# Patient Record
Sex: MALE | Race: OTHER | NOT HISPANIC OR LATINO
[De-identification: names, ages, dates, MRNs, and addresses within clinical notes are randomized per-mention and may not be internally consistent; named-entity substitution may affect disease eponyms.]

---

## 2017-03-15 PROBLEM — Z00.00 ENCOUNTER FOR PREVENTIVE HEALTH EXAMINATION: Status: ACTIVE | Noted: 2017-03-15

## 2017-05-10 ENCOUNTER — APPOINTMENT (OUTPATIENT)
Dept: CARDIOLOGY | Facility: CLINIC | Age: 82
End: 2017-05-10

## 2017-06-13 ENCOUNTER — OUTPATIENT (OUTPATIENT)
Dept: OUTPATIENT SERVICES | Facility: HOSPITAL | Age: 82
LOS: 1 days | Discharge: HOME | End: 2017-06-13

## 2017-06-13 DIAGNOSIS — N18.6 END STAGE RENAL DISEASE: ICD-10-CM

## 2017-06-13 DIAGNOSIS — E11.9 TYPE 2 DIABETES MELLITUS WITHOUT COMPLICATIONS: ICD-10-CM

## 2017-06-13 DIAGNOSIS — J96.00 ACUTE RESPIRATORY FAILURE, UNSPECIFIED WHETHER WITH HYPOXIA OR HYPERCAPNIA: ICD-10-CM

## 2017-06-13 DIAGNOSIS — I25.10 ATHEROSCLEROTIC HEART DISEASE OF NATIVE CORONARY ARTERY WITHOUT ANGINA PECTORIS: ICD-10-CM

## 2017-06-13 DIAGNOSIS — D64.9 ANEMIA, UNSPECIFIED: ICD-10-CM

## 2017-06-22 ENCOUNTER — APPOINTMENT (OUTPATIENT)
Dept: CARDIOLOGY | Facility: CLINIC | Age: 82
End: 2017-06-22

## 2017-06-28 DIAGNOSIS — R79.89 OTHER SPECIFIED ABNORMAL FINDINGS OF BLOOD CHEMISTRY: ICD-10-CM

## 2017-07-08 ENCOUNTER — OUTPATIENT (OUTPATIENT)
Dept: OUTPATIENT SERVICES | Facility: HOSPITAL | Age: 82
LOS: 1 days | Discharge: HOME | End: 2017-07-08

## 2017-07-08 DIAGNOSIS — D64.9 ANEMIA, UNSPECIFIED: ICD-10-CM

## 2017-07-08 DIAGNOSIS — I25.10 ATHEROSCLEROTIC HEART DISEASE OF NATIVE CORONARY ARTERY WITHOUT ANGINA PECTORIS: ICD-10-CM

## 2017-07-08 DIAGNOSIS — J96.00 ACUTE RESPIRATORY FAILURE, UNSPECIFIED WHETHER WITH HYPOXIA OR HYPERCAPNIA: ICD-10-CM

## 2017-07-08 DIAGNOSIS — E11.9 TYPE 2 DIABETES MELLITUS WITHOUT COMPLICATIONS: ICD-10-CM

## 2017-07-08 DIAGNOSIS — N18.6 END STAGE RENAL DISEASE: ICD-10-CM

## 2017-07-10 DIAGNOSIS — E10.9 TYPE 1 DIABETES MELLITUS WITHOUT COMPLICATIONS: ICD-10-CM

## 2017-07-10 DIAGNOSIS — E11.9 TYPE 2 DIABETES MELLITUS WITHOUT COMPLICATIONS: ICD-10-CM

## 2018-01-02 ENCOUNTER — OUTPATIENT (OUTPATIENT)
Dept: OUTPATIENT SERVICES | Facility: HOSPITAL | Age: 83
LOS: 1 days | Discharge: HOME | End: 2018-01-02

## 2018-01-02 DIAGNOSIS — D64.9 ANEMIA, UNSPECIFIED: ICD-10-CM

## 2018-01-02 DIAGNOSIS — I25.10 ATHEROSCLEROTIC HEART DISEASE OF NATIVE CORONARY ARTERY WITHOUT ANGINA PECTORIS: ICD-10-CM

## 2018-01-02 DIAGNOSIS — N18.9 CHRONIC KIDNEY DISEASE, UNSPECIFIED: ICD-10-CM

## 2018-01-02 DIAGNOSIS — N18.6 END STAGE RENAL DISEASE: ICD-10-CM

## 2018-01-02 DIAGNOSIS — J96.00 ACUTE RESPIRATORY FAILURE, UNSPECIFIED WHETHER WITH HYPOXIA OR HYPERCAPNIA: ICD-10-CM

## 2018-01-02 DIAGNOSIS — E11.9 TYPE 2 DIABETES MELLITUS WITHOUT COMPLICATIONS: ICD-10-CM

## 2018-02-11 ENCOUNTER — INPATIENT (INPATIENT)
Facility: HOSPITAL | Age: 83
LOS: 4 days | Discharge: NURSING, REHAB OR LONG-TERM CARE | End: 2018-02-16
Attending: INTERNAL MEDICINE | Admitting: INTERNAL MEDICINE

## 2018-02-11 VITALS
OXYGEN SATURATION: 100 % | RESPIRATION RATE: 18 BRPM | SYSTOLIC BLOOD PRESSURE: 121 MMHG | DIASTOLIC BLOOD PRESSURE: 56 MMHG | HEART RATE: 81 BPM

## 2018-02-11 DIAGNOSIS — Z95.1 PRESENCE OF AORTOCORONARY BYPASS GRAFT: Chronic | ICD-10-CM

## 2018-02-11 DIAGNOSIS — I25.810 ATHEROSCLEROSIS OF CORONARY ARTERY BYPASS GRAFT(S) WITHOUT ANGINA PECTORIS: ICD-10-CM

## 2018-02-11 DIAGNOSIS — N18.6 END STAGE RENAL DISEASE: ICD-10-CM

## 2018-02-11 DIAGNOSIS — I48.2 CHRONIC ATRIAL FIBRILLATION: ICD-10-CM

## 2018-02-11 DIAGNOSIS — I77.0 ARTERIOVENOUS FISTULA, ACQUIRED: Chronic | ICD-10-CM

## 2018-02-11 DIAGNOSIS — J18.9 PNEUMONIA, UNSPECIFIED ORGANISM: ICD-10-CM

## 2018-02-11 DIAGNOSIS — E11.22 TYPE 2 DIABETES MELLITUS WITH DIABETIC CHRONIC KIDNEY DISEASE: ICD-10-CM

## 2018-02-11 LAB
ANION GAP SERPL CALC-SCNC: 17 MMOL/L — HIGH (ref 7–14)
APTT BLD: 31.9 SEC — SIGNIFICANT CHANGE UP (ref 27–39.2)
BASOPHILS # BLD AUTO: 0.03 K/UL — SIGNIFICANT CHANGE UP (ref 0–0.2)
BASOPHILS NFR BLD AUTO: 0.2 % — SIGNIFICANT CHANGE UP (ref 0–1)
BUN SERPL-MCNC: 69 MG/DL — CRITICAL HIGH (ref 10–20)
CALCIUM SERPL-MCNC: 9 MG/DL — SIGNIFICANT CHANGE UP (ref 8.5–10.1)
CHLORIDE SERPL-SCNC: 93 MMOL/L — LOW (ref 98–110)
CK MB CFR SERPL CALC: 2.4 NG/ML — SIGNIFICANT CHANGE UP (ref 0.6–6.3)
CO2 SERPL-SCNC: 25 MMOL/L — SIGNIFICANT CHANGE UP (ref 17–32)
CREAT SERPL-MCNC: 5.5 MG/DL — CRITICAL HIGH (ref 0.7–1.5)
EOSINOPHIL # BLD AUTO: 0.03 K/UL — SIGNIFICANT CHANGE UP (ref 0–0.7)
EOSINOPHIL NFR BLD AUTO: 0.2 % — SIGNIFICANT CHANGE UP (ref 0–8)
GLUCOSE SERPL-MCNC: 164 MG/DL — HIGH (ref 70–110)
HCT VFR BLD CALC: 30 % — LOW (ref 42–52)
HGB BLD-MCNC: 8.5 G/DL — LOW (ref 14–18)
IMM GRANULOCYTES NFR BLD AUTO: 0.9 % — HIGH (ref 0.1–0.3)
INR BLD: 1.02 RATIO — SIGNIFICANT CHANGE UP (ref 0.65–1.3)
LACTATE SERPL-SCNC: 1.3 MMOL/L — SIGNIFICANT CHANGE UP (ref 0.5–2.2)
LYMPHOCYTES # BLD AUTO: 0.43 K/UL — LOW (ref 1.2–3.4)
LYMPHOCYTES # BLD AUTO: 2.2 % — LOW (ref 20.5–51.1)
MCHC RBC-ENTMCNC: 25.9 PG — LOW (ref 27–31)
MCHC RBC-ENTMCNC: 28.3 G/DL — LOW (ref 32–37)
MCV RBC AUTO: 91.5 FL — SIGNIFICANT CHANGE UP (ref 80–94)
MONOCYTES # BLD AUTO: 1.09 K/UL — HIGH (ref 0.1–0.6)
MONOCYTES NFR BLD AUTO: 5.5 % — SIGNIFICANT CHANGE UP (ref 1.7–9.3)
NEUTROPHILS # BLD AUTO: 17.99 K/UL — HIGH (ref 1.4–6.5)
NEUTROPHILS NFR BLD AUTO: 91 % — HIGH (ref 42.2–75.2)
PLATELET # BLD AUTO: 195 K/UL — SIGNIFICANT CHANGE UP (ref 130–400)
POTASSIUM SERPL-MCNC: 5.1 MMOL/L — HIGH (ref 3.5–5)
POTASSIUM SERPL-SCNC: 5.1 MMOL/L — HIGH (ref 3.5–5)
PROTHROM AB SERPL-ACNC: 11 SEC — SIGNIFICANT CHANGE UP (ref 9.95–12.87)
RBC # BLD: 3.28 M/UL — LOW (ref 4.7–6.1)
RBC # FLD: 19.5 % — HIGH (ref 11.5–14.5)
SODIUM SERPL-SCNC: 135 MMOL/L — SIGNIFICANT CHANGE UP (ref 135–146)
TROPONIN I SERPL-MCNC: 0.05 NG/ML — SIGNIFICANT CHANGE UP (ref 0–0.05)
WBC # BLD: 19.75 K/UL — HIGH (ref 4.8–10.8)
WBC # FLD AUTO: 19.75 K/UL — HIGH (ref 4.8–10.8)

## 2018-02-11 RX ORDER — CEFEPIME 1 G/1
500 INJECTION, POWDER, FOR SOLUTION INTRAMUSCULAR; INTRAVENOUS ONCE
Qty: 0 | Refills: 0 | Status: COMPLETED | OUTPATIENT
Start: 2018-02-11 | End: 2018-02-11

## 2018-02-11 RX ORDER — ATORVASTATIN CALCIUM 80 MG/1
40 TABLET, FILM COATED ORAL AT BEDTIME
Qty: 0 | Refills: 0 | Status: DISCONTINUED | OUTPATIENT
Start: 2018-02-11 | End: 2018-02-16

## 2018-02-11 RX ORDER — SIMETHICONE 80 MG/1
0.6 TABLET, CHEWABLE ORAL
Qty: 0 | Refills: 0 | COMMUNITY

## 2018-02-11 RX ORDER — NIFEDIPINE 30 MG
1 TABLET, EXTENDED RELEASE 24 HR ORAL
Qty: 0 | Refills: 0 | COMMUNITY

## 2018-02-11 RX ORDER — SODIUM CHLORIDE 9 MG/ML
500 INJECTION, SOLUTION INTRAVENOUS ONCE
Qty: 0 | Refills: 0 | Status: COMPLETED | OUTPATIENT
Start: 2018-02-11 | End: 2018-02-11

## 2018-02-11 RX ORDER — POLYETHYLENE GLYCOL 3350 17 G/17G
17 POWDER, FOR SOLUTION ORAL
Qty: 0 | Refills: 0 | COMMUNITY

## 2018-02-11 RX ORDER — SENNA PLUS 8.6 MG/1
2 TABLET ORAL
Qty: 0 | Refills: 0 | COMMUNITY

## 2018-02-11 RX ORDER — VANCOMYCIN HCL 1 G
VIAL (EA) INTRAVENOUS
Qty: 0 | Refills: 0 | Status: DISCONTINUED | OUTPATIENT
Start: 2018-02-11 | End: 2018-02-12

## 2018-02-11 RX ORDER — SENNA PLUS 8.6 MG/1
2 TABLET ORAL AT BEDTIME
Qty: 0 | Refills: 0 | Status: DISCONTINUED | OUTPATIENT
Start: 2018-02-11 | End: 2018-02-16

## 2018-02-11 RX ORDER — CEFEPIME 1 G/1
INJECTION, POWDER, FOR SOLUTION INTRAMUSCULAR; INTRAVENOUS
Qty: 0 | Refills: 0 | Status: DISCONTINUED | OUTPATIENT
Start: 2018-02-11 | End: 2018-02-16

## 2018-02-11 RX ORDER — PANTOPRAZOLE SODIUM 20 MG/1
40 TABLET, DELAYED RELEASE ORAL
Qty: 0 | Refills: 0 | Status: DISCONTINUED | OUTPATIENT
Start: 2018-02-11 | End: 2018-02-16

## 2018-02-11 RX ORDER — DOCUSATE SODIUM 100 MG
100 CAPSULE ORAL
Qty: 0 | Refills: 0 | Status: DISCONTINUED | OUTPATIENT
Start: 2018-02-11 | End: 2018-02-16

## 2018-02-11 RX ORDER — ATORVASTATIN CALCIUM 80 MG/1
1 TABLET, FILM COATED ORAL
Qty: 0 | Refills: 0 | COMMUNITY

## 2018-02-11 RX ORDER — VANCOMYCIN HCL 1 G
500 VIAL (EA) INTRAVENOUS ONCE
Qty: 0 | Refills: 0 | Status: COMPLETED | OUTPATIENT
Start: 2018-02-11 | End: 2018-02-11

## 2018-02-11 RX ORDER — ACETAMINOPHEN 500 MG
650 TABLET ORAL EVERY 6 HOURS
Qty: 0 | Refills: 0 | Status: DISCONTINUED | OUTPATIENT
Start: 2018-02-11 | End: 2018-02-16

## 2018-02-11 RX ORDER — IPRATROPIUM/ALBUTEROL SULFATE 18-103MCG
3 AEROSOL WITH ADAPTER (GRAM) INHALATION EVERY 6 HOURS
Qty: 0 | Refills: 0 | Status: DISCONTINUED | OUTPATIENT
Start: 2018-02-11 | End: 2018-02-16

## 2018-02-11 RX ORDER — AMIODARONE HYDROCHLORIDE 400 MG/1
100 TABLET ORAL
Qty: 0 | Refills: 0 | Status: DISCONTINUED | OUTPATIENT
Start: 2018-02-11 | End: 2018-02-16

## 2018-02-11 RX ORDER — CEFEPIME 1 G/1
1000 INJECTION, POWDER, FOR SOLUTION INTRAMUSCULAR; INTRAVENOUS EVERY 12 HOURS
Qty: 0 | Refills: 0 | Status: DISCONTINUED | OUTPATIENT
Start: 2018-02-11 | End: 2018-02-11

## 2018-02-11 RX ORDER — LACTULOSE 10 G/15ML
15 SOLUTION ORAL
Qty: 0 | Refills: 0 | COMMUNITY

## 2018-02-11 RX ORDER — ASPIRIN/CALCIUM CARB/MAGNESIUM 324 MG
81 TABLET ORAL DAILY
Qty: 0 | Refills: 0 | Status: DISCONTINUED | OUTPATIENT
Start: 2018-02-11 | End: 2018-02-16

## 2018-02-11 RX ORDER — HEPARIN SODIUM 5000 [USP'U]/ML
5000 INJECTION INTRAVENOUS; SUBCUTANEOUS EVERY 8 HOURS
Qty: 0 | Refills: 0 | Status: DISCONTINUED | OUTPATIENT
Start: 2018-02-11 | End: 2018-02-16

## 2018-02-11 RX ORDER — CLOPIDOGREL BISULFATE 75 MG/1
1 TABLET, FILM COATED ORAL
Qty: 0 | Refills: 0 | COMMUNITY

## 2018-02-11 RX ORDER — CEFEPIME 1 G/1
500 INJECTION, POWDER, FOR SOLUTION INTRAMUSCULAR; INTRAVENOUS EVERY 24 HOURS
Qty: 0 | Refills: 0 | Status: DISCONTINUED | OUTPATIENT
Start: 2018-02-11 | End: 2018-02-11

## 2018-02-11 RX ORDER — OMEPRAZOLE 10 MG/1
1 CAPSULE, DELAYED RELEASE ORAL
Qty: 0 | Refills: 0 | COMMUNITY

## 2018-02-11 RX ORDER — SEVELAMER CARBONATE 2400 MG/1
1 POWDER, FOR SUSPENSION ORAL
Qty: 0 | Refills: 0 | COMMUNITY

## 2018-02-11 RX ORDER — CLOPIDOGREL BISULFATE 75 MG/1
75 TABLET, FILM COATED ORAL DAILY
Qty: 0 | Refills: 0 | Status: DISCONTINUED | OUTPATIENT
Start: 2018-02-11 | End: 2018-02-16

## 2018-02-11 RX ORDER — AZITHROMYCIN 500 MG/1
TABLET, FILM COATED ORAL
Qty: 0 | Refills: 0 | Status: DISCONTINUED | OUTPATIENT
Start: 2018-02-11 | End: 2018-02-11

## 2018-02-11 RX ORDER — ALBUTEROL 90 UG/1
0.5 AEROSOL, METERED ORAL
Qty: 0 | Refills: 0 | COMMUNITY

## 2018-02-11 RX ORDER — DOCUSATE SODIUM 100 MG
2 CAPSULE ORAL
Qty: 0 | Refills: 0 | COMMUNITY

## 2018-02-11 RX ORDER — CEFEPIME 1 G/1
500 INJECTION, POWDER, FOR SOLUTION INTRAMUSCULAR; INTRAVENOUS EVERY 24 HOURS
Qty: 0 | Refills: 0 | Status: DISCONTINUED | OUTPATIENT
Start: 2018-02-12 | End: 2018-02-16

## 2018-02-11 RX ORDER — AZITHROMYCIN 500 MG/1
500 TABLET, FILM COATED ORAL ONCE
Qty: 0 | Refills: 0 | Status: COMPLETED | OUTPATIENT
Start: 2018-02-11 | End: 2018-02-11

## 2018-02-11 RX ORDER — SEVELAMER CARBONATE 2400 MG/1
800 POWDER, FOR SUSPENSION ORAL
Qty: 0 | Refills: 0 | Status: DISCONTINUED | OUTPATIENT
Start: 2018-02-11 | End: 2018-02-16

## 2018-02-11 RX ADMIN — Medication 125 MILLIGRAM(S): at 22:46

## 2018-02-11 RX ADMIN — CEFEPIME 100 MILLIGRAM(S): 1 INJECTION, POWDER, FOR SOLUTION INTRAMUSCULAR; INTRAVENOUS at 23:01

## 2018-02-11 RX ADMIN — AZITHROMYCIN 255 MILLIGRAM(S): 500 TABLET, FILM COATED ORAL at 18:14

## 2018-02-11 RX ADMIN — Medication 100 MILLIGRAM(S): at 23:39

## 2018-02-11 RX ADMIN — HEPARIN SODIUM 5000 UNIT(S): 5000 INJECTION INTRAVENOUS; SUBCUTANEOUS at 22:45

## 2018-02-11 RX ADMIN — SODIUM CHLORIDE 1000 MILLILITER(S): 9 INJECTION, SOLUTION INTRAVENOUS at 16:26

## 2018-02-11 RX ADMIN — CEFEPIME 100 MILLIGRAM(S): 1 INJECTION, POWDER, FOR SOLUTION INTRAMUSCULAR; INTRAVENOUS at 16:35

## 2018-02-11 NOTE — H&P ADULT - PROBLEM SELECTOR PLAN 1
High risk for drug resistant organisms due to ESRD and NH resident  - Febrile, CXR shows opacities, high WBC  - Vancomycin, Cefepime  - FU Blood, Urine, Sputum cultures, MRSA swab  - FU Influenza swab  - Tylenol for fevers  - BIPAP, O2, Nebulizers, Solumedrol (takes prednisone 5mg at home, unknown why)  - DNR but not DNI, intubate if resp decompensation  - Hold BP meds as BP is borderline

## 2018-02-11 NOTE — ED PROVIDER NOTE - PHYSICAL EXAMINATION
elderly, frail, tachypneic, Skin  warm and dry, no acute rash. PERRLA/EOM, conjunctiva and sclera clear. MM moist, no nasal discharge.  Pharynx and TM's unremarkable. Neck supple nt, no meningeal signs. Heart tachycardic. Lungs- b/l rhonchi and crackles. Abdomen soft ntnd no r/g. Extremities- moves all, +equal distal pulses, sensation wnl, normal ROM. No LE edema, calves nttp b/l.

## 2018-02-11 NOTE — H&P ADULT - PROBLEM SELECTOR PLAN 5
- Follow up FS  - Diet controlled, start insulin if FS elevated  - PPx: Heparin, Protonix  - DNR, not DNI  - From NH, discharge when stable back to [facility]  - Contact Daughter Faviola Nunez at [phone]

## 2018-02-11 NOTE — ED ADULT NURSE NOTE - OBJECTIVE STATEMENT
Pt presents to ED BIBA from [facility] for c/o respiratory distress. Pt normally on 3-4L NC and at NH pt O2 sat was 78%. Pt placed on BIPAP in ED. O2 sat 100%. As per EMS, pt had cough and febrile at NH. Pt afebrile in ED at this time.

## 2018-02-11 NOTE — H&P ADULT - NSHPLABSRESULTS_GEN_ALL_CORE
8.5    19.75 )-----------( 195      ( 11 Feb 2018 15:53 )             30.0   CBC Full  -  ( 11 Feb 2018 15:53 )  WBC Count : 19.75 K/uL  Hemoglobin : 8.5 g/dL  Hematocrit : 30.0 %  Platelet Count - Automated : 195 K/uL  Mean Cell Volume : 91.5 fL  Mean Cell Hemoglobin : 25.9 pg  Mean Cell Hemoglobin Concentration : 28.3 g/dL  Auto Neutrophil # : 17.99 K/uL  Auto Lymphocyte # : 0.43 K/uL  Auto Monocyte # : 1.09 K/uL  Auto Eosinophil # : 0.03 K/uL  Auto Basophil # : 0.03 K/uL  Auto Neutrophil % : 91.0 %  Auto Lymphocyte % : 2.2 %  Auto Monocyte % : 5.5 %  Auto Eosinophil % : 0.2 %  Auto Basophil % : 0.2 %    02-11    135  |  93<L>  |  69<HH>  ----------------------------<  164<H>  5.1<H>   |  25  |  5.5<HH>    Ca    9.0      11 Feb 2018 15:53        CARDIAC MARKERS ( 11 Feb 2018 15:53 )  0.05 ng/mL / x     / x     / x     / 2.4 ng/mL      PT/INR - ( 11 Feb 2018 15:53 )   PT: 11.00 sec;   INR: 1.02 ratio         PTT - ( 11 Feb 2018 15:53 )  PTT:31.9 sec    IMAGING:  CXR shows bilateral opacities, pending read

## 2018-02-11 NOTE — H&P ADULT - PROBLEM SELECTOR PLAN 2
- FU labs  - HD as needed; M/W/F  - FU renal recs  - c/w Renvela  - Regular nephrologist is Dr. Santiago

## 2018-02-11 NOTE — ED PROVIDER NOTE - ATTENDING CONTRIBUTION TO CARE
87 y/o male with hx of ESRD on HD, c/o fever, cough, SOB x 2 days. No vomiting. Presented with mild respiratory distress, lungs with coarse breath sounds b/l. S1 S2 regular. ABD soft, no tenderness. CXR with worsening right pleural effusion, b/l opacities. WBC 20. Imp: Ddx includes flu, pneumonia. No sign of PE. A/P: BiPAP, Abx, tamiflu, Will admit. Stable for floor admission.

## 2018-02-11 NOTE — H&P ADULT - PMH
Atrial fibrillation    Coronary artery disease involving coronary bypass graft without angina pectoris, unspecified whether native or transplanted heart    Diabetes    ESRD (end stage renal disease)    Hemodialysis access, fistula mature    Pleural effusion due to another disorder

## 2018-02-11 NOTE — H&P ADULT - NSHPREVIEWOFSYSTEMS_GEN_ALL_CORE
REVIEW OF SYSTEMS:    CONSTITUTIONAL: See HPI  EYES/ENT: No visual changes;  No vertigo or throat pain   NECK: No pain or stiffness  RESPIRATORY: See HPI  CARDIOVASCULAR: No chest pain or palpitations  GASTROINTESTINAL: No abdominal or epigastric pain. No nausea, vomiting, or hematemesis; No diarrhea or constipation. No melena or hematochezia.  GENITOURINARY: No dysuria, frequency or hematuria  NEUROLOGICAL: No numbness or weakness  SKIN: No itching, rashes

## 2018-02-11 NOTE — ED PROVIDER NOTE - OBJECTIVE STATEMENT
85 yo M from [facility] with pmh noted p/w respiratory distress and fever x 1 day, pt poor historian but denies any complaints, not in pain.

## 2018-02-11 NOTE — H&P ADULT - ATTENDING COMMENTS
Patient seen and examined and . Agree with above note except that   Patient is  1. septic on admission with Pneumonia, HCAP. Nursing home patient. need ID evaluation     2. Acute respiratory failure needing BIPAP, Hypoxic with Plural Effusion - Pulmonary evaluation if thoracocentesis is necessary.     Other wise c/w current management.     D/W House staff Problem: Pneumonia.  Plan: High risk for drug resistant organisms due to ESRD and NH resident  - Febrile, CXR shows opacities, high WBC  - Vancomycin, Cefepime  - FU Blood, Urine, Sputum cultures, MRSA swab  - FU Influenza swab  - Tylenol for fevers  - BIPAP, O2, Nebulizers, Solumedrol (takes prednisone 5mg at home, unknown why)  - DNR but not DNI, intubate if resp decompensation  - Hold BP meds as BP is borderline.      Problem/Plan - 2:  ·  Problem: ESRD (end stage renal disease).  Plan: - FU labs  - HD as needed; M/W/F  - FU renal recs  - c/w Renvela  - Regular nephrologist is Dr. Santiago.      Problem/Plan - 3:  ·  Problem: Coronary artery disease involving coronary bypass graft without angina pectoris, unspecified whether native or transplanted heart.  Plan: c/w ASA, Plavix, Lipitor.      Problem/Plan - 4:  ·  Problem: Chronic atrial fibrillation.  Plan: - Not on AC due to fall risk  - c/w Amiodarone, hold nifedipine, enalapril.      Problem/Plan - 5:  ·  Problem: Type 2 diabetes mellitus with chronic kidney disease on chronic dialysis, without long-term current use of insulin.  Plan: - Follow up FS  - Diet controlled, start insulin if FS elevated  - PPx: Heparin, Protonix  - DNR, not DNI  - From NH, discharge when stable back to [facility]  - Contact Daughter Faviola Nunez at [phone].           Patient seen and examined and . Agree with above note except that   Patient is  1. septic on admission with Pneumonia, HCAP. Nursing home patient. need ID evaluation     2. Acute respiratory failure needing BIPAP, Hypoxic with Plural Effusion - Pulmonary evaluation if thoracocentesis is necessary.     Other wise c/w current management.     D/W House staff 1. Cough fever lethargy hypoxia Sepsis hypotension 2/2 Pneumonia       Opacities on CXR, elevaeted WBC      c/w Vancomycin, Cefepime started at NH      FU Blood, Urine, Sputum cultures, MRSA/ Influenza swabs     Tylenol for fevers     Agree w/ BIPAP, O2, Nebulizers, Solumedrol       Pt is DNR but not DNI, intubate if resp decompensation     Hold BP meds      2.  ESRD       FU labs      HD  M/W/F      Renal eval      c/w Renvela     3.  CAD       c/w ASA, Plavix, Lipitor      4.   Chronic atrial fibrillation/ HTN       Not on AC due to fall risk       c/w Amiodarone, hold nifedipine, enalapril.     5.  DM2       Follow up FS       Diet controlled, start insulin if FS elevated    6. PPx: Heparin, Protonix        - Contact Daughter Faviola Nunez at [phone].           Patient seen and examined and . Agree with above note except that   Patient is  1. septic on admission with Pneumonia, HCAP. Nursing home patient. need ID evaluation     2. Acute respiratory failure needing BIPAP, Hypoxic with Plural Effusion - Pulmonary evaluation if thoracocentesis is necessary.     Other wise c/w current management.     D/W House staff

## 2018-02-11 NOTE — H&P ADULT - NSHPPHYSICALEXAM_GEN_ALL_CORE
PHYSICAL EXAM:  GENERAL: Elderly M lying in bed. In moderate respiratory distress  HEAD:  Atraumatic, Normocephalic  EYES: EOMI, PERRLA, conjunctiva and sclera clear  NECK: Supple, No JVD  CHEST/LUNG: Bilateral rales + rhonchi. No wheeze  HEART: Irregular rate and rhythm; No murmurs, rubs, or gallops  ABDOMEN: Soft, Nontender, Nondistended; Hypoactive bowel sounds  EXTREMITIES:  2+ Peripheral Pulses, No clubbing, cyanosis, +2 LE pitting edema b/l  PSYCH: AAOx3  NEUROLOGY: non-focal  SKIN: RLE bandage in place due to weeping blister

## 2018-02-11 NOTE — H&P ADULT - ASSESSMENT
86 M from NH with ESRD, AFib, CAD, p/w Fevers, leukocytosis, cough, lethargy, CXR shows bilateral pneumonia.

## 2018-02-11 NOTE — ED ADULT NURSE NOTE - CHIEF COMPLAINT QUOTE
Pt BIBA from [facility] for c/o respiratory distress, fever and cough x2days. Pt alert, in no distress. Pt placed on Bipap.

## 2018-02-12 RX ORDER — VANCOMYCIN HCL 1 G
500 VIAL (EA) INTRAVENOUS
Qty: 0 | Refills: 0 | Status: COMPLETED | OUTPATIENT
Start: 2018-02-12 | End: 2018-02-12

## 2018-02-12 RX ORDER — VANCOMYCIN HCL 1 G
500 VIAL (EA) INTRAVENOUS
Qty: 0 | Refills: 0 | Status: DISCONTINUED | OUTPATIENT
Start: 2018-02-12 | End: 2018-02-13

## 2018-02-12 RX ADMIN — Medication 100 MILLIGRAM(S): at 18:50

## 2018-02-12 RX ADMIN — SEVELAMER CARBONATE 800 MILLIGRAM(S): 2400 POWDER, FOR SUSPENSION ORAL at 18:52

## 2018-02-12 RX ADMIN — Medication 3 MILLILITER(S): at 20:52

## 2018-02-12 RX ADMIN — HEPARIN SODIUM 5000 UNIT(S): 5000 INJECTION INTRAVENOUS; SUBCUTANEOUS at 18:52

## 2018-02-12 RX ADMIN — Medication 81 MILLIGRAM(S): at 12:04

## 2018-02-12 RX ADMIN — CLOPIDOGREL BISULFATE 75 MILLIGRAM(S): 75 TABLET, FILM COATED ORAL at 12:04

## 2018-02-12 RX ADMIN — SENNA PLUS 2 TABLET(S): 8.6 TABLET ORAL at 22:49

## 2018-02-12 RX ADMIN — HEPARIN SODIUM 5000 UNIT(S): 5000 INJECTION INTRAVENOUS; SUBCUTANEOUS at 05:52

## 2018-02-12 RX ADMIN — Medication 3 MILLILITER(S): at 01:23

## 2018-02-12 RX ADMIN — Medication 125 MILLIGRAM(S): at 05:52

## 2018-02-12 RX ADMIN — HEPARIN SODIUM 5000 UNIT(S): 5000 INJECTION INTRAVENOUS; SUBCUTANEOUS at 22:47

## 2018-02-12 RX ADMIN — ATORVASTATIN CALCIUM 40 MILLIGRAM(S): 80 TABLET, FILM COATED ORAL at 22:49

## 2018-02-12 RX ADMIN — AMIODARONE HYDROCHLORIDE 100 MILLIGRAM(S): 400 TABLET ORAL at 18:51

## 2018-02-12 RX ADMIN — Medication 125 MILLIGRAM(S): at 18:50

## 2018-02-12 RX ADMIN — Medication 100 MILLIGRAM(S): at 16:19

## 2018-02-12 RX ADMIN — SEVELAMER CARBONATE 800 MILLIGRAM(S): 2400 POWDER, FOR SUSPENSION ORAL at 18:50

## 2018-02-12 NOTE — PATIENT PROFILE ADULT. - PMH
Atrial fibrillation    Chronic obstructive pulmonary disease, unspecified COPD type    Coronary artery disease involving coronary bypass graft without angina pectoris, unspecified whether native or transplanted heart    Diabetes    ESRD (end stage renal disease)    Hemodialysis access, fistula mature    Hypertension, unspecified type    Pleural effusion due to another disorder

## 2018-02-12 NOTE — PROGRESS NOTE ADULT - SUBJECTIVE AND OBJECTIVE BOX
KEE GARCIA, male, 86y ([birth year]),   MRN-[id number]  Admit Date: 02-11-18 (1d)    Chief Complaint  Patient is a 86y old  Male who presents with a chief complaint of Cough, Fevers, Lethargy x 2 days (11 Feb 2018 22:07)      Past Medical and Surgical History  PAST MEDICAL & SURGICAL HISTORY:  Atrial fibrillation  Pleural effusion due to another disorder  Diabetes  Coronary artery disease involving coronary bypass graft without angina pectoris, unspecified whether native or transplanted heart  Hemodialysis access, fistula mature  ESRD (end stage renal disease)  A-V fistula  S/P CABG x 3      Current Medications:  MEDICATIONS  (STANDING):  ALBUTerol/ipratropium for Nebulization 3 milliLiter(s) Nebulizer every 6 hours  amiodarone    Tablet 100 milliGRAM(s) Oral two times a day  aspirin enteric coated 81 milliGRAM(s) Oral daily  atorvastatin 40 milliGRAM(s) Oral at bedtime  cefepime  IVPB      cefepime  IVPB 500 milliGRAM(s) IV Intermittent every 24 hours  clopidogrel Tablet 75 milliGRAM(s) Oral daily  docusate sodium 100 milliGRAM(s) Oral two times a day  heparin  Injectable 5000 Unit(s) SubCutaneous every 8 hours  methylPREDNISolone sodium succinate Injectable 125 milliGRAM(s) IV Push every 8 hours  pantoprazole   Suspension 40 milliGRAM(s) Oral before breakfast  senna 2 Tablet(s) Oral at bedtime  sevelamer hydrochloride 800 milliGRAM(s) Oral three times a day with meals  vancomycin  IVPB        MEDICATIONS  (PRN):  acetaminophen  Suppository 650 milliGRAM(s) Rectal every 6 hours PRN For Temp greater than 38 C (100.4 F)      Interval History:  No acute events overnight. No complaints at this time.    Vital Signs:  T(F): 96.4 (02-12-18 @ 13:35), Max: 101.6 (02-11-18 @ 16:20)  HR: 71 (02-12-18 @ 13:35) (71 - 84)  BP: 120/60 (02-12-18 @ 13:35) (90/57 - 151/65)  RR: 20 (02-12-18 @ 13:35) (18 - 20)  SpO2: 97% (02-12-18 @ 13:35) (97% - 100%)  CAPILLARY BLOOD GLUCOSE          Physical Exam:  General: Not in distress.   HEENT: Moist mucus membranes. PERRLA.  Cardio: irrigular rate and rhythm, S1, S2, no murmur, rub, or gallop.  Pulm: Clear to auscultation bilaterally. No wheezing, rales, or rhonchi  Abdomen: Soft, non-tender, non-distended. Normoactive bowel sounds  Extremities: No cyanosis or edema bilaterally. No calf tenderness to palpation  Neuro: Cantonese speaking A&O x3. No focal deficits. CN II - XII grossly intact.    Labs and Imaging:  CBC Full  -  ( 11 Feb 2018 15:53 )  WBC Count : 19.75 K/uL  Hemoglobin : 8.5 g/dL  Hematocrit : 30.0 %  Platelet Count - Automated : 195 K/uL  Mean Cell Volume : 91.5 fL  Mean Cell Hemoglobin : 25.9 pg  Mean Cell Hemoglobin Concentration : 28.3 g/dL  Auto Neutrophil # : 17.99 K/uL  Auto Lymphocyte # : 0.43 K/uL  Auto Monocyte # : 1.09 K/uL  Auto Eosinophil # : 0.03 K/uL  Auto Basophil # : 0.03 K/uL  Auto Neutrophil % : 91.0 %  Auto Lymphocyte % : 2.2 %  Auto Monocyte % : 5.5 %  Auto Eosinophil % : 0.2 %  Auto Basophil % : 0.2 %    RDW: 19.5    PT/INR/PTT: 02-11-18 @ 15:53  11.00 | 31.9        ^      1.02    BMP: 02-11-18 @ 15:53  135 | 93 | 69   -----------------< 164  5.1  | 25 | 5.5  eGFR(AA): 10, eGFR (non-AA): 9   Ca 9.0, Mg --, P --        LFTs: 02-11-18 @ 15:53  Ca  9.0  | -- AST   -----------------  TP  --  | -- ALT  -----------------  Alb --  | -- ALK          ^        --         TB      Cardiac Enzymes:  Troponin I, Serum: 0.05 ng/mL (02-11-18 @ 15:53)    Urinalysis:    Cultures:      Home Medications:  Home Medications:  albuterol 5 mg/mL (0.5%) inhalation solution: 0.5 milliliter(s) inhaled every 6 hours (11 Feb 2018 21:52)  amiodarone 100 mg oral tablet: 2 tab(s) orally once a day (11 Feb 2018 21:52)  Aspir 81 oral delayed release tablet: 1 tab(s) orally once a day (11 Feb 2018 21:52)  Colace 100 mg oral capsule: 2 cap(s) orally once a day (11 Feb 2018 21:52)  enalapril 5 mg oral tablet: 1 tab(s) orally once a day (11 Feb 2018 21:52)  lactulose 10 g/15 mL oral syrup: 15 milliliter(s) orally once a day (11 Feb 2018 21:52)  Lipitor 40 mg oral tablet: 1 tab(s) orally once a day (11 Feb 2018 21:52)  MiraLax oral powder for reconstitution: 17 gram(s) orally once a day (11 Feb 2018 21:52)  NIFEdipine 60 mg oral tablet, extended release: 1 tab(s) orally once a day (11 Feb 2018 21:52)  Plavix 75 mg oral tablet: 1 tab(s) orally once a day (11 Feb 2018 21:52)  predniSONE 5 mg oral tablet: 1 tab(s) orally once a day (11 Feb 2018 21:52)  PriLOSEC 20 mg oral delayed release capsule: 1 cap(s) orally once a day (11 Feb 2018 21:52)  Renvela 800 mg oral tablet: 1 tab(s) orally 3 times a day (with meals) (11 Feb 2018 21:52)  Senokot 8.6 mg oral tablet: 2 tab(s) orally once a day (at bedtime) (11 Feb 2018 21:52)  simethicone 40 mg/0.6 mL oral liquid: 0.6 milliliter(s) orally 4 times a day (after meals and at bedtime) (11 Feb 2018 21:52)      Assessment:  86y male with PMH listed presented for .  Found to have Pneumonia  Type 2 diabetes mellitus with chronic kidney disease on chronic dialysis, without long-term current use of insulin  Chronic atrial fibrillation  Coronary artery disease involving coronary bypass graft without angina pectoris, unspecified whether native or transplanted heart  ESRD (end stage renal disease)  Pneumonia      Plan:  Pneumonia  Type 2 diabetes mellitus with chronic kidney disease on chronic dialysis, without long-term current use of insulin  Chronic atrial fibrillation  Coronary artery disease involving coronary bypass graft without angina pectoris, unspecified whether native or transplanted heart  ESRD (end stage renal disease)  Pneumonia      Tiffani Bucio MD.  Date/Time: 02-12-18 @ 13:46

## 2018-02-12 NOTE — CONSULT NOTE ADULT - ASSESSMENT
# ESRD on HD for HD today 3h opti 160 , 2K UF 2 l as tolerated  # rule out PNA , rule out flu on cefepime and Vanco already continue, check blood cultures, follow up official CXR   # Anemia acute on chronic will check SUSANNA dose at NH, check Fe studies transfuse if Hb<7

## 2018-02-12 NOTE — CONSULT NOTE ADULT - SUBJECTIVE AND OBJECTIVE BOX
NEPHROLOGY CONSULTATION NOTE    Patient is a 86y Male whom presented to the hospital with     PAST MEDICAL & SURGICAL HISTORY:  Atrial fibrillation  Pleural effusion due to another disorder  Diabetes  Coronary artery disease involving coronary bypass graft without angina pectoris, unspecified whether native or transplanted heart  Hemodialysis access, fistula mature  ESRD (end stage renal disease)  A-V fistula  S/P CABG x 3    Allergies:  No Known Allergies    Home Medications Reviewed  Hospital Medications:   MEDICATIONS  (STANDING):  ALBUTerol/ipratropium for Nebulization 3 milliLiter(s) Nebulizer every 6 hours  amiodarone    Tablet 100 milliGRAM(s) Oral two times a day  aspirin enteric coated 81 milliGRAM(s) Oral daily  atorvastatin 40 milliGRAM(s) Oral at bedtime  cefepime  IVPB      cefepime  IVPB 500 milliGRAM(s) IV Intermittent every 24 hours  clopidogrel Tablet 75 milliGRAM(s) Oral daily  docusate sodium 100 milliGRAM(s) Oral two times a day  heparin  Injectable 5000 Unit(s) SubCutaneous every 8 hours  methylPREDNISolone sodium succinate Injectable 125 milliGRAM(s) IV Push every 8 hours  pantoprazole   Suspension 40 milliGRAM(s) Oral before breakfast  senna 2 Tablet(s) Oral at bedtime  sevelamer hydrochloride 800 milliGRAM(s) Oral three times a day with meals  vancomycin  IVPB          SOCIAL HISTORY:  Denies ETOH,Smoking,   FAMILY HISTORY:  No pertinent family history in first degree relatives        REVIEW OF SYSTEMS:  CONSTITUTIONAL:  weakness, no fevers or chills  EYES/ENT: No visual changes;  No vertigo or throat pain   NECK: No pain or stiffness  RESPIRATORY: shortness of breath with cough  CARDIOVASCULAR: No chest pain or palpitations.  GASTROINTESTINAL: No abdominal or epigastric pain. No nausea, vomiting, or hematemesis; No diarrhea or constipation. No melena or hematochezia.  GENITOURINARY: No dysuria, frequency, foamy urine, urinary urgency, incontinence or hematuria  VASCULAR: No bilateral lower extremity edema.   All other review of systems is negative unless indicated above.    VITALS:  T(F): 96.4 (02-12-18 @ 07:48), Max: 101.6 (02-11-18 @ 16:20)  HR: 73 (02-12-18 @ 07:48)  BP: 151/65 (02-12-18 @ 07:48)  RR: 20 (02-12-18 @ 07:48)  SpO2: 99% (02-12-18 @ 07:48)    02-11 @ 07:01  -  02-12 @ 07:00  --------------------------------------------------------  IN: 1350 mL / OUT: 0 mL / NET: 1350 mL            I&O's Detail    11 Feb 2018 07:01  -  12 Feb 2018 07:00  --------------------------------------------------------  IN:    IV PiggyBack: 350 mL    Lactated Ringers IV Bolus: 1000 mL  Total IN: 1350 mL    OUT:  Total OUT: 0 mL    Total NET: 1350 mL            PHYSICAL EXAM:  Constitutional: NAD  HEENT: anicteric sclera, oropharynx clear, MMM  Neck: No JVD  Respiratory: CTAB, no wheezes, rales or rhonchi  Cardiovascular: S1, S2, RRR  Gastrointestinal: BS+, soft, NT/ND  Extremities: No cyanosis or clubbing. No peripheral edema  Neurological: A/O x 3, no focal deficits  Psychiatric: Normal mood, normal affect  : No CVA tenderness. No mcclain.   Skin: No rashes  Vascular Access:    LABS:  02-11    135  |  93<L>  |  69<HH>  ----------------------------<  164<H>  5.1<H>   |  25  |  5.5<HH>    Ca    9.0      11 Feb 2018 15:53      Creatinine Trend: 5.5 <--                        8.5    19.75 )-----------( 195      ( 11 Feb 2018 15:53 )             30.0     Urine Studies:      RADIOLOGY & ADDITIONAL STUDIES:   results pending NEPHROLOGY CONSULTATION NOTE    Patient is a 86y Male whom presented to the hospital with shortness of breath fever and chills going back to several days ptp . Denied any chest pain no chest pain no GI symptoms no other complaints.    PAST MEDICAL & SURGICAL HISTORY:  Atrial fibrillation  Pleural effusion due to another disorder  Diabetes  Coronary artery disease involving coronary bypass graft without angina pectoris, unspecified whether native or transplanted heart  Hemodialysis access, fistula mature  ESRD (end stage renal disease)  A-V fistula  S/P CABG x 3    Allergies:  No Known Allergies    Home Medications Reviewed  Hospital Medications:   MEDICATIONS  (STANDING):  ALBUTerol/ipratropium for Nebulization 3 milliLiter(s) Nebulizer every 6 hours  amiodarone    Tablet 100 milliGRAM(s) Oral two times a day  aspirin enteric coated 81 milliGRAM(s) Oral daily  atorvastatin 40 milliGRAM(s) Oral at bedtime  cefepime  IVPB      cefepime  IVPB 500 milliGRAM(s) IV Intermittent every 24 hours  clopidogrel Tablet 75 milliGRAM(s) Oral daily  docusate sodium 100 milliGRAM(s) Oral two times a day  heparin  Injectable 5000 Unit(s) SubCutaneous every 8 hours  methylPREDNISolone sodium succinate Injectable 125 milliGRAM(s) IV Push every 8 hours  pantoprazole   Suspension 40 milliGRAM(s) Oral before breakfast  senna 2 Tablet(s) Oral at bedtime  sevelamer hydrochloride 800 milliGRAM(s) Oral three times a day with meals  vancomycin  IVPB          SOCIAL HISTORY:  Denies ETOH,Smoking,   FAMILY HISTORY:  No pertinent family history in first degree relatives        REVIEW OF SYSTEMS:  CONSTITUTIONAL:  positive weakness, fevers at NH   EYES/ENT: No visual changes;  No vertigo or throat pain   NECK: No pain or stiffness  RESPIRATORY: shortness of breath with cough  CARDIOVASCULAR: No chest pain or palpitations.  GASTROINTESTINAL: No abdominal or epigastric pain. No nausea, vomiting, or hematemesis; No diarrhea or constipation. No melena or hematochezia.  GENITOURINARY: No dysuria, frequency, foamy urine, urinary urgency, incontinence or hematuria  VASCULAR: No bilateral lower extremity edema.   All other review of systems is negative unless indicated above.    VITALS:  T(F): 96.4 (02-12-18 @ 07:48), Max: 101.6 (02-11-18 @ 16:20)  HR: 73 (02-12-18 @ 07:48)  BP: 151/65 (02-12-18 @ 07:48)  RR: 20 (02-12-18 @ 07:48)  SpO2: 99% (02-12-18 @ 07:48)    02-11 @ 07:01  -  02-12 @ 07:00  --------------------------------------------------------  IN: 1350 mL / OUT: 0 mL / NET: 1350 mL            I&O's Detail    11 Feb 2018 07:01  -  12 Feb 2018 07:00  --------------------------------------------------------  IN:    IV PiggyBack: 350 mL    Lactated Ringers IV Bolus: 1000 mL  Total IN: 1350 mL    OUT:  Total OUT: 0 mL    Total NET: 1350 mL            PHYSICAL EXAM:  Constitutional: weak lethargic   HEENT: anicteric sclera, oropharynx clear, MMM  Neck: No JVD  Respiratory: wheezes and rhonchi at bases   Cardiovascular: S1, S2, RRR  Gastrointestinal: BS+, soft, NT/ND  Extremities: No cyanosis or clubbing. No peripheral edema  Neurological: A/O x 3, no focal deficits  Vascular Access: left arm AVF     LABS:  02-11    135  |  93<L>  |  69<HH>  ----------------------------<  164<H>  5.1<H>   |  25  |  5.5<HH>    Ca    9.0      11 Feb 2018 15:53      Creatinine Trend: 5.5 <--                        8.5    19.75 )-----------( 195      ( 11 Feb 2018 15:53 )             30.0     Urine Studies:      RADIOLOGY & ADDITIONAL STUDIES:   results pending

## 2018-02-12 NOTE — PROGRESS NOTE ADULT - PROBLEM SELECTOR PLAN 1
High risk for drug resistant organisms due to ESRD and NH resident  - Febrile on admission. No fevers in last 24hrs  , CXR shows opacities, high WBC  - c/w Vancomycin, Cefepime  - FU Blood, Urine, Sputum cultures, MRSA swab  - FU Influenza swab  - Tylenol for fevers  - BIPAP, O2, Nebulizers, Solumedrol (takes prednisone 5mg at home, unknown why)  - DNR but not DNI, intubate if resp decompensation  - Hold BP meds as BP is borderline

## 2018-02-12 NOTE — PROGRESS NOTE ADULT - ATTENDING COMMENTS
1. Cough fever lethargy hypoxia Sepsis hypotension 2/2 Pneumonia       Opacities on CXR, elevaeted WBC      c/w Vancomycin, Cefepime started at NH      FU Blood, Urine, Sputum cultures, MRSA/ Influenza swabs     Tylenol for fevers     Agree w/ BIPAP, O2, Nebulizers, Solumedrol       Pt is DNR but not DNI, intubate if resp decompensation     Hold BP meds      2.  ESRD       FU labs      HD  M/W/F      Renal eval      c/w Renvela     3.  CAD       c/w ASA, Plavix, Lipitor      4.   Chronic atrial fibrillation/ HTN       Not on AC due to fall risk       c/w Amiodarone, hold nifedipine, enalapril.     5.  DM2       Follow up FS       Diet controlled, start insulin if FS elevated    6. PPx: Heparin, Protonix 1. Cough fever lethargy hypoxia Sepsis hypotension 2/2 Pneumonia       Opacities on CXR, elevaeted WBC      c/w Vancomycin, Cefepime started at NH      FU Blood, Urine, Sputum cultures, MRSA/ Influenza swabs     Tylenol for fevers     Agree w/ BIPAP, O2, Nebulizers, Solumedrol       Pt is DNR but not DNI, intubate if resp decompensation     Hold BP meds      2.  ESRD       FU labs      HD  M/W/F      Renal eval appreciated      c/w Renvela     3.  CAD       c/w ASA, Plavix, Lipitor      4.   Chronic atrial fibrillation/ HTN       Not on AC due to fall risk       c/w Amiodarone, hold nifedipine, enalapril.     5.  DM2       Follow up FS       Diet controlled, start insulin if FS elevated    6. PPx: Heparin, Protonix

## 2018-02-12 NOTE — PROGRESS NOTE ADULT - SUBJECTIVE AND OBJECTIVE BOX
KEE GARCIA, male, 86y ([birth year]),   MRN-[id number]  Admit Date: 02-11-18 (1d)    Chief Complaint  Patient is a 86y old  Male who presents with a chief complaint of Cough, Fevers, Lethargy x 2 days (11 Feb 2018 22:07)      Past Medical and Surgical History  PAST MEDICAL & SURGICAL HISTORY:  Atrial fibrillation  Pleural effusion due to another disorder  Diabetes  Coronary artery disease involving coronary bypass graft without angina pectoris, unspecified whether native or transplanted heart  Hemodialysis access, fistula mature  ESRD (end stage renal disease)  A-V fistula  S/P CABG x 3      Current Medications:  MEDICATIONS  (STANDING):  ALBUTerol/ipratropium for Nebulization 3 milliLiter(s) Nebulizer every 6 hours  amiodarone    Tablet 100 milliGRAM(s) Oral two times a day  aspirin enteric coated 81 milliGRAM(s) Oral daily  atorvastatin 40 milliGRAM(s) Oral at bedtime  cefepime  IVPB      cefepime  IVPB 500 milliGRAM(s) IV Intermittent every 24 hours  clopidogrel Tablet 75 milliGRAM(s) Oral daily  docusate sodium 100 milliGRAM(s) Oral two times a day  heparin  Injectable 5000 Unit(s) SubCutaneous every 8 hours  methylPREDNISolone sodium succinate Injectable 125 milliGRAM(s) IV Push every 8 hours  pantoprazole   Suspension 40 milliGRAM(s) Oral before breakfast  senna 2 Tablet(s) Oral at bedtime  sevelamer hydrochloride 800 milliGRAM(s) Oral three times a day with meals  vancomycin  IVPB        MEDICATIONS  (PRN):  acetaminophen  Suppository 650 milliGRAM(s) Rectal every 6 hours PRN For Temp greater than 38 C (100.4 F)      Interval History:  No acute events overnight. No complaints at this time.    Vital Signs:  T(F): 96.4 (02-12-18 @ 07:48), Max: 101.6 (02-11-18 @ 16:20)  HR: 73 (02-12-18 @ 07:48) (73 - 84)  BP: 151/65 (02-12-18 @ 07:48) (90/57 - 151/65)  RR: 20 (02-12-18 @ 07:48) (18 - 20)  SpO2: 98% (02-12-18 @ 09:45) (98% - 100%)            Physical Exam:  General: Not in distress.   HEENT: Moist mucus membranes. PERRLA.  Cardio: Regular rate and rhythm, S1, S2, no murmur, rub, or gallop.  Pulm: Clear to auscultation bilaterally. No wheezing, rales, or rhonchi  Abdomen: Soft, non-tender, non-distended. Normoactive bowel sounds  Extremities: No cyanosis or edema bilaterally. No calf tenderness to palpation  Neuro: A&O x3. No focal deficits. CN II - XII grossly intact.    Labs and Imaging:  CBC Full  -  ( 11 Feb 2018 15:53 )  WBC Count : 19.75 K/uL  Hemoglobin : 8.5 g/dL  Hematocrit : 30.0 %  Platelet Count - Automated : 195 K/uL  Mean Cell Volume : 91.5 fL  Mean Cell Hemoglobin : 25.9 pg  Mean Cell Hemoglobin Concentration : 28.3 g/dL  Auto Neutrophil # : 17.99 K/uL  Auto Lymphocyte # : 0.43 K/uL  Auto Monocyte # : 1.09 K/uL  Auto Eosinophil # : 0.03 K/uL  Auto Basophil # : 0.03 K/uL  Auto Neutrophil % : 91.0 %  Auto Lymphocyte % : 2.2 %  Auto Monocyte % : 5.5 %  Auto Eosinophil % : 0.2 %  Auto Basophil % : 0.2 %    RDW: 19.5    PT/INR/PTT: 02-11-18 @ 15:53  11.00 | 31.9        ^      1.02    BMP: 02-11-18 @ 15:53  135 | 93 | 69   -----------------< 164  5.1  | 25 | 5.5  eGFR(AA): 10, eGFR (non-AA): 9   Ca 9.0, Mg --, P --        LFTs: 02-11-18 @ 15:53  Ca  9.0  | -- AST   -----------------  TP  --  | -- ALT  -----------------  Alb --  | -- ALK          ^        --         TB      Cardiac Enzymes:  Troponin I, Serum: 0.05 ng/mL (02-11-18 @ 15:53)      Home Medications:  Home Medications:  albuterol 5 mg/mL (0.5%) inhalation solution: 0.5 milliliter(s) inhaled every 6 hours (11 Feb 2018 21:52)  amiodarone 100 mg oral tablet: 2 tab(s) orally once a day (11 Feb 2018 21:52)  Aspir 81 oral delayed release tablet: 1 tab(s) orally once a day (11 Feb 2018 21:52)  Colace 100 mg oral capsule: 2 cap(s) orally once a day (11 Feb 2018 21:52)  enalapril 5 mg oral tablet: 1 tab(s) orally once a day (11 Feb 2018 21:52)  lactulose 10 g/15 mL oral syrup: 15 milliliter(s) orally once a day (11 Feb 2018 21:52)  Lipitor 40 mg oral tablet: 1 tab(s) orally once a day (11 Feb 2018 21:52)  MiraLax oral powder for reconstitution: 17 gram(s) orally once a day (11 Feb 2018 21:52)  NIFEdipine 60 mg oral tablet, extended release: 1 tab(s) orally once a day (11 Feb 2018 21:52)  Plavix 75 mg oral tablet: 1 tab(s) orally once a day (11 Feb 2018 21:52)  predniSONE 5 mg oral tablet: 1 tab(s) orally once a day (11 Feb 2018 21:52)  PriLOSEC 20 mg oral delayed release capsule: 1 cap(s) orally once a day (11 Feb 2018 21:52)  Renvela 800 mg oral tablet: 1 tab(s) orally 3 times a day (with meals) (11 Feb 2018 21:52)  Senokot 8.6 mg oral tablet: 2 tab(s) orally once a day (at bedtime) (11 Feb 2018 21:52)  simethicone 40 mg/0.6 mL oral liquid: 0.6 milliliter(s) orally 4 times a day (after meals and at bedtime) (11 Feb 2018 21:52)      Assessment:  86y male with PMH listed presented for .  Found to have Pneumonia  Type 2 diabetes mellitus with chronic kidney disease on chronic dialysis, without long-term current use of insulin  Chronic atrial fibrillation  Coronary artery disease involving coronary bypass graft without angina pectoris, unspecified whether native or transplanted heart  ESRD (end stage renal disease)  Pneumonia      Plan:  Pneumonia  Type 2 diabetes mellitus with chronic kidney disease on chronic dialysis, without long-term current use of insulin  Chronic atrial fibrillation  Coronary artery disease involving coronary bypass graft without angina pectoris, unspecified whether native or transplanted heart  ESRD (end stage renal disease)  Pneumonia      Tiffani Bucio MD.  Date/Time: 02-12-18 @ 13:28 KEE GARCIA, male, 86y ([birth year]),   [id number]  Admit Date: 02-11-18 (1d)    Chief Complaint  Patient is a 86y old  Male who presents with a chief complaint of Cough, Fevers, Lethargy x 2 days (11 Feb 2018 22:07)      Past Medical and Surgical History  PAST MEDICAL & SURGICAL HISTORY:  Atrial fibrillation  Pleural effusion due to another disorder  Diabetes  Coronary artery disease involving coronary bypass graft without angina pectoris, unspecified whether native or transplanted heart  Hemodialysis access, fistula mature  ESRD (end stage renal disease)  A-V fistula  S/P CABG x 3      Current Medications:  MEDICATIONS  (STANDING):  ALBUTerol/ipratropium for Nebulization 3 milliLiter(s) Nebulizer every 6 hours  amiodarone    Tablet 100 milliGRAM(s) Oral two times a day  aspirin enteric coated 81 milliGRAM(s) Oral daily  atorvastatin 40 milliGRAM(s) Oral at bedtime  cefepime  IVPB      cefepime  IVPB 500 milliGRAM(s) IV Intermittent every 24 hours  clopidogrel Tablet 75 milliGRAM(s) Oral daily  docusate sodium 100 milliGRAM(s) Oral two times a day  heparin  Injectable 5000 Unit(s) SubCutaneous every 8 hours  methylPREDNISolone sodium succinate Injectable 125 milliGRAM(s) IV Push every 8 hours  pantoprazole   Suspension 40 milliGRAM(s) Oral before breakfast  senna 2 Tablet(s) Oral at bedtime  sevelamer hydrochloride 800 milliGRAM(s) Oral three times a day with meals  vancomycin  IVPB        MEDICATIONS  (PRN):  acetaminophen  Suppository 650 milliGRAM(s) Rectal every 6 hours PRN For Temp greater than 38 C (100.4 F)      Interval History:  No acute events overnight. No complaints at this time.    Vital Signs:  T(F): 96.4 (02-12-18 @ 07:48), Max: 101.6 (02-11-18 @ 16:20)  HR: 73 (02-12-18 @ 07:48) (73 - 84)  BP: 151/65 (02-12-18 @ 07:48) (90/57 - 151/65)  RR: 20 (02-12-18 @ 07:48) (18 - 20)  SpO2: 98% (02-12-18 @ 09:45) (98% - 100%)            Physical Exam:  General: Not in distress.   HEENT: Moist mucus membranes. PERRLA.  Cardio: Regular rate and rhythm, S1, S2, no murmur, rub, or gallop.  Pulm: Clear to auscultation bilaterally. No wheezing, rales, or rhonchi  Abdomen: Soft, non-tender, non-distended. Normoactive bowel sounds  Extremities: No cyanosis or edema bilaterally. No calf tenderness to palpation  Neuro: A&O x3. No focal deficits. CN II - XII grossly intact.    Labs and Imaging:  CBC Full  -  ( 11 Feb 2018 15:53 )  WBC Count : 19.75 K/uL  Hemoglobin : 8.5 g/dL  Hematocrit : 30.0 %  Platelet Count - Automated : 195 K/uL  Mean Cell Volume : 91.5 fL  Mean Cell Hemoglobin : 25.9 pg  Mean Cell Hemoglobin Concentration : 28.3 g/dL  Auto Neutrophil # : 17.99 K/uL  Auto Lymphocyte # : 0.43 K/uL  Auto Monocyte # : 1.09 K/uL  Auto Eosinophil # : 0.03 K/uL  Auto Basophil # : 0.03 K/uL  Auto Neutrophil % : 91.0 %  Auto Lymphocyte % : 2.2 %  Auto Monocyte % : 5.5 %  Auto Eosinophil % : 0.2 %  Auto Basophil % : 0.2 %    RDW: 19.5    PT/INR/PTT: 02-11-18 @ 15:53  11.00 | 31.9        ^      1.02    BMP: 02-11-18 @ 15:53  135 | 93 | 69   -----------------< 164  5.1  | 25 | 5.5  eGFR(AA): 10, eGFR (non-AA): 9   Ca 9.0, Mg --, P --        LFTs: 02-11-18 @ 15:53  Ca  9.0  | -- AST   -----------------  TP  --  | -- ALT  -----------------  Alb --  | -- ALK          ^        --         TB      Cardiac Enzymes:  Troponin I, Serum: 0.05 ng/mL (02-11-18 @ 15:53)    < from: Xray Chest 1 View AP/PA (02.11.18 @ 17:48) >  Large right-sided pleural effusion which is increased in size from the   prior study        Home Medications:  Home Medications:  albuterol 5 mg/mL (0.5%) inhalation solution: 0.5 milliliter(s) inhaled every 6 hours (11 Feb 2018 21:52)  amiodarone 100 mg oral tablet: 2 tab(s) orally once a day (11 Feb 2018 21:52)  Aspir 81 oral delayed release tablet: 1 tab(s) orally once a day (11 Feb 2018 21:52)  Colace 100 mg oral capsule: 2 cap(s) orally once a day (11 Feb 2018 21:52)  enalapril 5 mg oral tablet: 1 tab(s) orally once a day (11 Feb 2018 21:52)  lactulose 10 g/15 mL oral syrup: 15 milliliter(s) orally once a day (11 Feb 2018 21:52)  Lipitor 40 mg oral tablet: 1 tab(s) orally once a day (11 Feb 2018 21:52)  MiraLax oral powder for reconstitution: 17 gram(s) orally once a day (11 Feb 2018 21:52)  NIFEdipine 60 mg oral tablet, extended release: 1 tab(s) orally once a day (11 Feb 2018 21:52)  Plavix 75 mg oral tablet: 1 tab(s) orally once a day (11 Feb 2018 21:52)  predniSONE 5 mg oral tablet: 1 tab(s) orally once a day (11 Feb 2018 21:52)  PriLOSEC 20 mg oral delayed release capsule: 1 cap(s) orally once a day (11 Feb 2018 21:52)  Renvela 800 mg oral tablet: 1 tab(s) orally 3 times a day (with meals) (11 Feb 2018 21:52)  Senokot 8.6 mg oral tablet: 2 tab(s) orally once a day (at bedtime) (11 Feb 2018 21:52)  simethicone 40 mg/0.6 mL oral liquid: 0.6 milliliter(s) orally 4 times a day (after meals and at bedtime) (11 Feb 2018 21:52)      Assessment:  86y male with PMH listed presented for .  Found to have Pneumonia  Type 2 diabetes mellitus with chronic kidney disease on chronic dialysis, without long-term current use of insulin  Chronic atrial fibrillation  Coronary artery disease involving coronary bypass graft without angina pectoris, unspecified whether native or transplanted heart  ESRD (end stage renal disease)  Pneumonia      Plan:  Pneumonia  Type 2 diabetes mellitus with chronic kidney disease on chronic dialysis, without long-term current use of insulin  Chronic atrial fibrillation  Coronary artery disease involving coronary bypass graft without angina pectoris, unspecified whether native or transplanted heart  ESRD (end stage renal disease)  Pneumonia      Tiffani Bucio MD.  Date/Time: 02-12-18 @ 13:28

## 2018-02-12 NOTE — PROGRESS NOTE ADULT - PROBLEM SELECTOR PLAN 1
High risk for drug resistant organisms due to ESRD and NH resident  - Febrile, CXR shows opacities, high WBC  - Vancomycin, Cefepime  - FU Blood, Urine, Sputum cultures, MRSA swab  - FU Influenza swab  - Tylenol for fevers  - BIPAP, O2, Nebulizers, Solumedrol (takes prednisone 5mg at home, unknown why)  - DNR but not DNI, intubate if resp decompensation  - Hold BP meds as BP is borderline High risk for drug resistant organisms due to ESRD and NH resident  - Febrile, CXR shows opacities, high WBC  - Vancomycin, Cefepime  -Large pleural effusion on R side. Pulmonary consult.   - FU Blood, Urine, Sputum cultures, MRSA swab  - FU Influenza swab  - Tylenol for fevers  - BIPAP, O2, Nebulizers, Solumedrol (takes prednisone 5mg at home, unknown why)  - DNR but not DNI, intubate if resp decompensation  - Hold BP meds as BP is borderline

## 2018-02-13 DIAGNOSIS — M79.89 OTHER SPECIFIED SOFT TISSUE DISORDERS: ICD-10-CM

## 2018-02-13 LAB
ALBUMIN SERPL ELPH-MCNC: 2.1 G/DL — LOW (ref 3–5.5)
ALP SERPL-CCNC: 126 U/L — HIGH (ref 30–115)
ALT FLD-CCNC: 15 U/L — SIGNIFICANT CHANGE UP (ref 0–41)
ANION GAP SERPL CALC-SCNC: 16 MMOL/L — HIGH (ref 7–14)
AST SERPL-CCNC: 19 U/L — SIGNIFICANT CHANGE UP (ref 0–41)
BILIRUB SERPL-MCNC: 0.7 MG/DL — SIGNIFICANT CHANGE UP (ref 0.2–1.2)
BUN SERPL-MCNC: 52 MG/DL — HIGH (ref 10–20)
CALCIUM SERPL-MCNC: 8.4 MG/DL — LOW (ref 8.5–10.1)
CHLORIDE SERPL-SCNC: 94 MMOL/L — LOW (ref 98–110)
CO2 SERPL-SCNC: 27 MMOL/L — SIGNIFICANT CHANGE UP (ref 17–32)
CREAT SERPL-MCNC: 4.2 MG/DL — CRITICAL HIGH (ref 0.7–1.5)
GLUCOSE SERPL-MCNC: 170 MG/DL — HIGH (ref 70–110)
HCT VFR BLD CALC: 26.4 % — LOW (ref 42–52)
HGB BLD-MCNC: 7.8 G/DL — LOW (ref 14–18)
MAGNESIUM SERPL-MCNC: 2.7 MG/DL — HIGH (ref 1.8–2.4)
MCHC RBC-ENTMCNC: 26.4 PG — LOW (ref 27–31)
MCHC RBC-ENTMCNC: 29.5 G/DL — LOW (ref 32–37)
MCV RBC AUTO: 89.2 FL — SIGNIFICANT CHANGE UP (ref 80–94)
NRBC # BLD: 0 /100 WBCS — SIGNIFICANT CHANGE UP (ref 0–0)
PLATELET # BLD AUTO: 153 K/UL — SIGNIFICANT CHANGE UP (ref 130–400)
POTASSIUM SERPL-MCNC: 4.9 MMOL/L — SIGNIFICANT CHANGE UP (ref 3.5–5)
POTASSIUM SERPL-SCNC: 4.9 MMOL/L — SIGNIFICANT CHANGE UP (ref 3.5–5)
PROT SERPL-MCNC: 5.5 G/DL — LOW (ref 6–8)
RBC # BLD: 2.96 M/UL — LOW (ref 4.7–6.1)
RBC # FLD: 20 % — HIGH (ref 11.5–14.5)
SODIUM SERPL-SCNC: 137 MMOL/L — SIGNIFICANT CHANGE UP (ref 135–146)
WBC # BLD: 18.84 K/UL — HIGH (ref 4.8–10.8)
WBC # FLD AUTO: 18.84 K/UL — HIGH (ref 4.8–10.8)

## 2018-02-13 RX ORDER — BACITRACIN ZINC 500 UNIT/G
1 OINTMENT IN PACKET (EA) TOPICAL
Qty: 0 | Refills: 0 | Status: DISCONTINUED | OUTPATIENT
Start: 2018-02-13 | End: 2018-02-16

## 2018-02-13 RX ORDER — VANCOMYCIN HCL 1 G
500 VIAL (EA) INTRAVENOUS
Qty: 0 | Refills: 0 | Status: DISCONTINUED | OUTPATIENT
Start: 2018-02-13 | End: 2018-02-14

## 2018-02-13 RX ORDER — SALICYLIC ACID 0.5 %
1 CLEANSER (GRAM) TOPICAL
Qty: 0 | Refills: 0 | Status: DISCONTINUED | OUTPATIENT
Start: 2018-02-13 | End: 2018-02-16

## 2018-02-13 RX ORDER — OXYCODONE HYDROCHLORIDE 5 MG/1
5 TABLET ORAL THREE TIMES A DAY
Qty: 0 | Refills: 0 | Status: DISCONTINUED | OUTPATIENT
Start: 2018-02-13 | End: 2018-02-16

## 2018-02-13 RX ADMIN — Medication 125 MILLIGRAM(S): at 00:08

## 2018-02-13 RX ADMIN — SEVELAMER CARBONATE 800 MILLIGRAM(S): 2400 POWDER, FOR SUSPENSION ORAL at 12:37

## 2018-02-13 RX ADMIN — Medication 1 APPLICATION(S): at 18:10

## 2018-02-13 RX ADMIN — SENNA PLUS 2 TABLET(S): 8.6 TABLET ORAL at 21:57

## 2018-02-13 RX ADMIN — HEPARIN SODIUM 5000 UNIT(S): 5000 INJECTION INTRAVENOUS; SUBCUTANEOUS at 06:42

## 2018-02-13 RX ADMIN — Medication 40 MILLIGRAM(S): at 21:58

## 2018-02-13 RX ADMIN — HEPARIN SODIUM 5000 UNIT(S): 5000 INJECTION INTRAVENOUS; SUBCUTANEOUS at 13:10

## 2018-02-13 RX ADMIN — CEFEPIME 100 MILLIGRAM(S): 1 INJECTION, POWDER, FOR SOLUTION INTRAMUSCULAR; INTRAVENOUS at 21:57

## 2018-02-13 RX ADMIN — CLOPIDOGREL BISULFATE 75 MILLIGRAM(S): 75 TABLET, FILM COATED ORAL at 12:37

## 2018-02-13 RX ADMIN — OXYCODONE HYDROCHLORIDE 5 MILLIGRAM(S): 5 TABLET ORAL at 20:01

## 2018-02-13 RX ADMIN — Medication 1 APPLICATION(S): at 06:38

## 2018-02-13 RX ADMIN — ATORVASTATIN CALCIUM 40 MILLIGRAM(S): 80 TABLET, FILM COATED ORAL at 21:57

## 2018-02-13 RX ADMIN — AMIODARONE HYDROCHLORIDE 100 MILLIGRAM(S): 400 TABLET ORAL at 06:36

## 2018-02-13 RX ADMIN — SEVELAMER CARBONATE 800 MILLIGRAM(S): 2400 POWDER, FOR SUSPENSION ORAL at 18:01

## 2018-02-13 RX ADMIN — HEPARIN SODIUM 5000 UNIT(S): 5000 INJECTION INTRAVENOUS; SUBCUTANEOUS at 21:58

## 2018-02-13 RX ADMIN — Medication 125 MILLIGRAM(S): at 13:09

## 2018-02-13 RX ADMIN — PANTOPRAZOLE SODIUM 40 MILLIGRAM(S): 20 TABLET, DELAYED RELEASE ORAL at 07:52

## 2018-02-13 RX ADMIN — Medication 3 MILLILITER(S): at 14:27

## 2018-02-13 RX ADMIN — AMIODARONE HYDROCHLORIDE 100 MILLIGRAM(S): 400 TABLET ORAL at 18:01

## 2018-02-13 RX ADMIN — SEVELAMER CARBONATE 800 MILLIGRAM(S): 2400 POWDER, FOR SUSPENSION ORAL at 07:52

## 2018-02-13 RX ADMIN — Medication 3 MILLILITER(S): at 07:52

## 2018-02-13 RX ADMIN — CEFEPIME 100 MILLIGRAM(S): 1 INJECTION, POWDER, FOR SOLUTION INTRAMUSCULAR; INTRAVENOUS at 00:31

## 2018-02-13 RX ADMIN — Medication 100 MILLIGRAM(S): at 06:37

## 2018-02-13 RX ADMIN — Medication 125 MILLIGRAM(S): at 07:52

## 2018-02-13 RX ADMIN — Medication 3 MILLILITER(S): at 20:08

## 2018-02-13 RX ADMIN — Medication 81 MILLIGRAM(S): at 12:38

## 2018-02-13 RX ADMIN — OXYCODONE HYDROCHLORIDE 5 MILLIGRAM(S): 5 TABLET ORAL at 19:36

## 2018-02-13 RX ADMIN — Medication 100 MILLIGRAM(S): at 18:03

## 2018-02-13 NOTE — PROGRESS NOTE ADULT - ASSESSMENT
ESRD/pleural effusions/CHF / anemia :  # s/p HD yesterday, hd in am   # seen by pulmonary   # change vanco to post hd  # check vanco level  # start darbo 20, check iron stores, tx if h <7  # will follow

## 2018-02-13 NOTE — CONSULT NOTE ADULT - ASSESSMENT
Impression:    B/l pleural effusion right>left  HFrEF,   ESRD on HD    Plan:  right side therapeutic thoracentesis for symptomatic improvement  Bipap as needed and at night  oxygen to keep kentrell >90%  keep negative balance  HD tomorrow  Maximize cardiac treatment  code status  spoke with Daughter in detail about the procedure and code status. Impression:  Acute on chronic hypoxic respiratory failure  Hypercapnia possibly secondary to central sleep apnea  B/l pleural effusion right>left  HFrEF,   ESRD on HD    Plan:  right side therapeutic thoracentesis for symptomatic improvement   Bipap as needed and at night  oxygen to keep kentrell >90%  keep negative balance  HD tomorrow  Maximize cardiac treatment  follow up renal/cardiology  Clarify code status  spoke with Daughter in detail about the procedure and code status. Impression:  Acute on chronic hypoxic respiratory failure with Hypercapnia   B/l pleural effusion right>left  HFrEF,   ESRD on HD    Plan:  right side therapeutic thoracentesis for symptomatic improvement   Bipap as needed and at night  oxygen to keep kentrell >90%  keep negative balance  HD tomorrow  send viral panel/ PAN culture  Maximize cardiac treatment  follow up renal/cardiology  Clarify code status  spoke with Daughter in detail about the procedure and code status. Impression:  Acute on chronic hypoxic respiratory failure with Hypercapnia   B/l pleural effusion right>left  HFrEF,   ESRD on HD    Plan:  right side therapeutic thoracentesis for symptomatic improvement if needed. Patient comfortable at this time  Bipap as needed and at night  oxygen to keep kentrell >90%  keep negative balance  HD tomorrow  send viral panel/ PAN culture  Maximize cardiac treatment  follow up renal/cardiology  Clarify code status  spoke with Daughter in detail about the procedure and code status. Impression:  Chronic hypercapnic respiratory failure   Chronic right sided pleural effusion   ESRD on HD    Plan:  Favor supportive care  Patient will not benefit from invasive procedures  We can consider right thoracentesis if patient becomes symptomatic (for comfort)  DVT prophylaxis  Aspiration precautions  Prognosis poor     spoke with Daughter in detail about the procedure and code status.

## 2018-02-13 NOTE — CONSULT NOTE ADULT - SUBJECTIVE AND OBJECTIVE BOX
Patient is a 86y old  Male who presents with a chief complaint of Cough, Fevers, Lethargy x 2 days (11 Feb 2018 22:07)      HPI:  This is an 85 YO M, PMH ESRD on HD, CAD s/p CABG 2016 and PCI 2017, AFib, recurrent pleural effusions, DM, presents from [facility] with 2 days of lethargy, subjective fevers, low BP, cough, and hypoxia 78% on 3-4 liters NC. He is a poor historian, cantonese speaking, family at bedside to interpret, but patient is currently on BiPAP and difficulty answering questions. Denies pain or discomfort at this time. Per daughter, patient was on Tamiflu last month as prophylaxis due to outbreak at NH.    MEDICATIONS GIVEN IN ED:  Cefepime, Azithromycin    VITALS:   Vital Signs Last 24 Hrs        T(C): 36.1 (02-13-18 @ 05:30), Max: 36.1 (02-13-18 @ 05:30)  HR: 80 (02-13-18 @ 05:30) (71 - 81)  BP: 116/16 (02-13-18 @ 05:30) (116/16 - 139/61)  RR: 18 (02-13-18 @ 05:30) (18 - 20)  SpO2: 98% (02-13-18 @ 06:46) (97% - 98%)              PAST MEDICAL & SURGICAL HISTORY:  Hypertension, unspecified type  Chronic obstructive pulmonary disease, unspecified COPD type  Atrial fibrillation  Pleural effusion due to another disorder  Diabetes  Coronary artery disease involving coronary bypass graft without angina pectoris, unspecified whether native or transplanted heart  Hemodialysis access, fistula mature  ESRD (end stage renal disease)  A-V fistula  S/P CABG x 3      SOCIAL HX:   ex Smoking                             FAMILY HISTORY:  No pertinent family history in first degree relatives          Allergies    No Known Allergies              General:    HEENT: AAO *3            Lymph Nodes: No lymphadenapathy  Neck:  Supple  Lungs: decreased breathe sound on right  Cardiovascular: S1S2  Abdomen: Soft, non tender  Extremities: NO edema or cyanosis  Skin: Intact      02-12-18 @ 07:01  -  02-13-18 @ 07:00  --------------------------------------------------------  IN: 0 mL / OUT: 2000 mL / NET: -2000 mL            LAB:                        8.5    19.75 )-----------( 195      ( 11 Feb 2018 15:53 )             30.0                                               02-11    135  |  93<L>  |  69<HH>  ----------------------------<  164<H>  5.1<H>   |  25  |  5.5<HH>    Ca    9.0      11 Feb 2018 15:53        PT/INR - ( 11 Feb 2018 15:53 )   PT: 11.00 sec;   INR: 1.02 ratio         PTT - ( 11 Feb 2018 15:53 )  PTT:31.9 sec                                               CARDIAC MARKERS ( 11 Feb 2018 15:53 )  0.05 ng/mL / x     / x     / x     / 2.4 ng/mL                                                                                                                               ABG - ( 11 Feb 2018 22:33 )  pH: 7.23  /  pCO2: 67    /  pO2: 81    / HCO3: 28    / Base Excess: -1.6  /  SaO2: 94                  MEDICATIONS  (STANDING):  ALBUTerol/ipratropium for Nebulization 3 milliLiter(s) Nebulizer every 6 hours  amiodarone    Tablet 100 milliGRAM(s) Oral two times a day  aspirin enteric coated 81 milliGRAM(s) Oral daily  atorvastatin 40 milliGRAM(s) Oral at bedtime  BACItracin   Ointment 1 Application(s) Topical two times a day  cefepime  IVPB 500 milliGRAM(s) IV Intermittent every 24 hours  cefepime  IVPB      clopidogrel Tablet 75 milliGRAM(s) Oral daily  docusate sodium 100 milliGRAM(s) Oral two times a day  heparin  Injectable 5000 Unit(s) SubCutaneous every 8 hours  methylPREDNISolone sodium succinate Injectable 125 milliGRAM(s) IV Push every 8 hours  pantoprazole   Suspension 40 milliGRAM(s) Oral before breakfast  senna 2 Tablet(s) Oral at bedtime  sevelamer hydrochloride 800 milliGRAM(s) Oral three times a day with meals  vancomycin  IVPB 500 milliGRAM(s) IV Intermittent every 72 hours  vitamin A &amp; D Ointment 1 Application(s) Topical two times a day    MEDICATIONS  (PRN):  acetaminophen  Suppository 650 milliGRAM(s) Rectal every 6 hours PRN For Temp greater than 38 C (100.4 F) HPI:  This is an 87 YO M, [facility] ESRD on HD, CAD s/p CABG 2016 and PCI 2017, AFib, recurrent pleural effusions, DM, presents from [facility] for ?CPA, low grade Temp, and hypoxia 78% on 3-4 liters NC. He is a poor historian, cantonese speaking. Denies pain or discomfort at this time. Per daughter, patient was on Tamiflu last month as prophylaxis due to outbreak at NH.    MEDICATIONS GIVEN IN ED:  Cefepime, Azithromycin    VITALS:   Vital Signs Last 24 Hrs        T(C): 36.1 (02-13-18 @ 05:30), Max: 36.1 (02-13-18 @ 05:30)  HR: 80 (02-13-18 @ 05:30) (71 - 81)  BP: 116/16 (02-13-18 @ 05:30) (116/16 - 139/61)  RR: 18 (02-13-18 @ 05:30) (18 - 20)  SpO2: 98% (02-13-18 @ 06:46) (97% - 98%)              PAST MEDICAL & SURGICAL HISTORY:  Hypertension, unspecified type  Chronic obstructive pulmonary disease, unspecified COPD type  Atrial fibrillation  Pleural effusion due to another disorder  Diabetes  Coronary artery disease involving coronary bypass graft without angina pectoris, unspecified whether native or transplanted heart  Hemodialysis access, fistula mature  ESRD (end stage renal disease)  A-V fistula  S/P CABG x 3      SOCIAL HX:   ex Smoking                             FAMILY HISTORY:  No pertinent family history in first degree relatives          Allergies    No Known Allergies              General:    HEENT: AAO *3            Lymph Nodes: No lymphadenapathy  Neck:  Supple  Lungs: decreased breathe sound on right  Cardiovascular: S1S2  Abdomen: Soft, non tender  Extremities: NO edema or cyanosis  Skin: Intact      02-12-18 @ 07:01  -  02-13-18 @ 07:00  --------------------------------------------------------  IN: 0 mL / OUT: 2000 mL / NET: -2000 mL            LAB:                        8.5    19.75 )-----------( 195      ( 11 Feb 2018 15:53 )             30.0                                               02-11    135  |  93<L>  |  69<HH>  ----------------------------<  164<H>  5.1<H>   |  25  |  5.5<HH>    Ca    9.0      11 Feb 2018 15:53        PT/INR - ( 11 Feb 2018 15:53 )   PT: 11.00 sec;   INR: 1.02 ratio         PTT - ( 11 Feb 2018 15:53 )  PTT:31.9 sec                                               CARDIAC MARKERS ( 11 Feb 2018 15:53 )  0.05 ng/mL / x     / x     / x     / 2.4 ng/mL                                                                                                                               ABG - ( 11 Feb 2018 22:33 )  pH: 7.23  /  pCO2: 67    /  pO2: 81    / HCO3: 28    / Base Excess: -1.6  /  SaO2: 94                  MEDICATIONS  (STANDING):  ALBUTerol/ipratropium for Nebulization 3 milliLiter(s) Nebulizer every 6 hours  amiodarone    Tablet 100 milliGRAM(s) Oral two times a day  aspirin enteric coated 81 milliGRAM(s) Oral daily  atorvastatin 40 milliGRAM(s) Oral at bedtime  BACItracin   Ointment 1 Application(s) Topical two times a day  cefepime  IVPB 500 milliGRAM(s) IV Intermittent every 24 hours  cefepime  IVPB      clopidogrel Tablet 75 milliGRAM(s) Oral daily  docusate sodium 100 milliGRAM(s) Oral two times a day  heparin  Injectable 5000 Unit(s) SubCutaneous every 8 hours  methylPREDNISolone sodium succinate Injectable 125 milliGRAM(s) IV Push every 8 hours  pantoprazole   Suspension 40 milliGRAM(s) Oral before breakfast  senna 2 Tablet(s) Oral at bedtime  sevelamer hydrochloride 800 milliGRAM(s) Oral three times a day with meals  vancomycin  IVPB 500 milliGRAM(s) IV Intermittent every 72 hours  vitamin A &amp; D Ointment 1 Application(s) Topical two times a day    MEDICATIONS  (PRN):  acetaminophen  Suppository 650 milliGRAM(s) Rectal every 6 hours PRN For Temp greater than 38 C (100.4 F)

## 2018-02-13 NOTE — PROGRESS NOTE ADULT - SUBJECTIVE AND OBJECTIVE BOX
[facility] FOLLOW UP NOTE  --------------------------------------------------------------------------------  Chief Complaint:    24 hour events/subjective:    patient seen and examined      Standing Inpatient Medications  ALBUTerol/ipratropium for Nebulization 3 milliLiter(s) Nebulizer every 6 hours  amiodarone    Tablet 100 milliGRAM(s) Oral two times a day  aspirin enteric coated 81 milliGRAM(s) Oral daily  atorvastatin 40 milliGRAM(s) Oral at bedtime  BACItracin   Ointment 1 Application(s) Topical two times a day  cefepime  IVPB 500 milliGRAM(s) IV Intermittent every 24 hours  cefepime  IVPB      clopidogrel Tablet 75 milliGRAM(s) Oral daily  docusate sodium 100 milliGRAM(s) Oral two times a day  heparin  Injectable 5000 Unit(s) SubCutaneous every 8 hours  methylPREDNISolone sodium succinate Injectable 125 milliGRAM(s) IV Push every 8 hours  pantoprazole   Suspension 40 milliGRAM(s) Oral before breakfast  senna 2 Tablet(s) Oral at bedtime  sevelamer hydrochloride 800 milliGRAM(s) Oral three times a day with meals  vancomycin  IVPB 500 milliGRAM(s) IV Intermittent every 72 hours  vitamin A &amp; D Ointment 1 Application(s) Topical two times a day    PRN Inpatient Medications  acetaminophen  Suppository 650 milliGRAM(s) Rectal every 6 hours PRN      VITALS/PHYSICAL EXAM  --------------------------------------------------------------------------------  T(C): 36.1 (02-13-18 @ 05:30), Max: 36.1 (02-13-18 @ 05:30)  HR: 80 (02-13-18 @ 05:30) (71 - 81)  BP: 116/16 (02-13-18 @ 05:30) (116/16 - 139/61)  RR: 18 (02-13-18 @ 05:30) (18 - 20)  SpO2: 98% (02-13-18 @ 06:46) (97% - 98%)          02-12-18 @ 07:01  -  02-13-18 @ 07:00  --------------------------------------------------------  IN: 0 mL / OUT: 2000 mL / NET: -2000 mL      Physical Exam:  	Gen: NAD,   	Pulm: decrease BS B/L   	CV: RRR, S1S2; no rub  	Abd: distended  	Vascular access: av fistula     LABS/STUDIES  --------------------------------------------------------------------------------              7.8    18.84 >-----------<  153      [02-13-18 @ 07:38]              26.4     137  |  94  |  52  ----------------------------<  170      [02-13-18 @ 07:38]  4.9   |  27  |  x         Ca     8.4     [02-13-18 @ 07:38]      Mg     2.7     [02-13-18 @ 07:38]    TPro  5.5  /  Alb  2.1  /  TBili  0.7  /  DBili  x   /  AST  19  /  ALT  15  /  AlkPhos  126  [02-13-18 @ 07:38]    PT/INR: PT 11.00, INR 1.02       [02-11-18 @ 15:53]  PTT: 31.9       [02-11-18 @ 15:53]    Troponin 0.05      [02-11-18 @ 15:53]    Creatinine Trend:  SCr 5.5 [02-11 @ 15:53]

## 2018-02-13 NOTE — PROGRESS NOTE ADULT - ATTENDING COMMENTS
1. Cough fever lethargy hypoxia Sepsis hypotension 2/2 Pneumonia       Opacities on CXR, elevated WBC      c/w Vancomycin, Cefepime started at NH      FU Blood, Urine, Sputum cultures, MRSA/ Influenza swabs     Tylenol for fevers     Agree w/ BIPAP, O2, Nebulizers, Solumedrol       Pt is DNR but not DNI, intubate if resp decompensation     Hold BP meds      2.  ESRD       FU labs      HD  M/W/F      Renal eval appreciated      c/w Renvela     3.  CAD       c/w ASA, Plavix, Lipitor      4.   Chronic atrial fibrillation/ HTN       Not on AC due to fall risk       c/w Amiodarone, hold nifedipine, enalapril    5.  DM2       Follow up FS       Diet controlled, start insulin if FS elevated    6. PPx: Heparin, Protonix

## 2018-02-13 NOTE — PROGRESS NOTE ADULT - PROBLEM SELECTOR PLAN 1
High risk for drug resistant organisms due to ESRD and NH resident  - Febrile on admission. No fevers in last 24hrs  , CXR shows opacities, high WBC  - c/w Vancomycin, Cefepime  - FU Blood, Urine, Sputum cultures, MRSA swab  - BIPAP, O2, Nebulizers, Solumedrol (takes prednisone 5mg at home, unknown why)  - DNR but not DNI, intubate if resp decompensation

## 2018-02-13 NOTE — PROGRESS NOTE ADULT - SUBJECTIVE AND OBJECTIVE BOX
KEE GARCIA  86y  Male    Patient is a 86y old  Male who presents with a chief complaint of Cough, Fevers, Lethargy x 2 days (11 Feb 2018 22:07)      INTERVAL HPI/OVERNIGHT EVENTS:    T(C): 36 (02-12-18 @ 20:32), Max: 37.1 (02-12-18 @ 05:00)  HR: 77 (02-12-18 @ 20:32) (71 - 81)  BP: 116/68 (02-12-18 @ 20:32) (116/68 - 151/65)  RR: 20 (02-12-18 @ 20:32) (20 - 20)  SpO2: 98% (02-12-18 @ 16:57) (97% - 100%)  Wt(kg): --Vital Signs Last 24 Hrs  T(C): 36 (12 Feb 2018 20:32), Max: 37.1 (12 Feb 2018 05:00)  T(F): 96.8 (12 Feb 2018 20:32), Max: 98.7 (12 Feb 2018 05:00)  HR: 77 (12 Feb 2018 20:32) (71 - 81)  BP: 116/68 (12 Feb 2018 20:32) (116/68 - 151/65)  BP(mean): --  RR: 20 (12 Feb 2018 20:32) (20 - 20)  SpO2: 98% (12 Feb 2018 16:57) (97% - 100%)    PHYSICAL EXAM:  GENERAL: NAD, well-groomed, well-developed  HEAD:  Atraumatic, Normocephalic  NECK: Supple, No JVD, Normal thyroid  NERVOUS SYSTEM:  Alert & Oriented X3, Good concentration; Motor Strength 5/5 B/L upper and lower extremities; DTRs 2+ intact and symmetric  CHEST/LUNG: Clear to percussion bilaterally; No rales, rhonchi, wheezing, or rubs  HEART: Regular rate and rhythm; No murmurs, rubs, or gallops  ABDOMEN: Soft, Nontender, Nondistended; Bowel sounds present  EXTREMITIES:  2+ Peripheral Pulses, No clubbing, cyanosis, or edema    Consultant(s) Notes Reviewed:  [x ] YES  [ ] NO    Discussed with Consultants/Other Providers/Residents [ x] YES     LABS                         8.5    19.75 )-----------( 195      ( 11 Feb 2018 15:53 )             30.0     02-11    135  |  93<L>  |  69<HH>  ----------------------------<  164<H>  5.1<H>   |  25  |  5.5<HH>    Ca    9.0      11 Feb 2018 15:53      PT/INR - ( 11 Feb 2018 15:53 )   PT: 11.00 sec;   INR: 1.02 ratio         PTT - ( 11 Feb 2018 15:53 )  PTT:31.9 sec  ABG - ( 11 Feb 2018 22:33 )  pH: 7.23  /  pCO2: 67    /  pO2: 81    / HCO3: 28    / Base Excess: -1.6  /  SaO2: 94                    CAPILLARY BLOOD GLUCOSE            RADIOLOGY & ADDITIONAL TESTS:    Imaging Personally Reviewed:  [x ] YES  [ ] NO    MEDICATIONS  (STANDING):  ALBUTerol/ipratropium for Nebulization 3 milliLiter(s) Nebulizer every 6 hours  amiodarone    Tablet 100 milliGRAM(s) Oral two times a day  aspirin enteric coated 81 milliGRAM(s) Oral daily  atorvastatin 40 milliGRAM(s) Oral at bedtime  BACItracin   Ointment 1 Application(s) Topical two times a day  cefepime  IVPB 500 milliGRAM(s) IV Intermittent every 24 hours  cefepime  IVPB      clopidogrel Tablet 75 milliGRAM(s) Oral daily  docusate sodium 100 milliGRAM(s) Oral two times a day  heparin  Injectable 5000 Unit(s) SubCutaneous every 8 hours  methylPREDNISolone sodium succinate Injectable 125 milliGRAM(s) IV Push every 8 hours  pantoprazole   Suspension 40 milliGRAM(s) Oral before breakfast  senna 2 Tablet(s) Oral at bedtime  sevelamer hydrochloride 800 milliGRAM(s) Oral three times a day with meals  vancomycin  IVPB 500 milliGRAM(s) IV Intermittent every 72 hours  vitamin A &amp; D Ointment 1 Application(s) Topical two times a day    MEDICATIONS  (PRN):  acetaminophen  Suppository 650 milliGRAM(s) Rectal every 6 hours PRN For Temp greater than 38 C (100.4 F)      HEALTH ISSUES - PROBLEM Dx:  Type 2 diabetes mellitus with chronic kidney disease on chronic dialysis, without long-term current use of insulin: Type 2 diabetes mellitus with chronic kidney disease on chronic dialysis, without long-term current use of insulin  Chronic atrial fibrillation: Chronic atrial fibrillation  Coronary artery disease involving coronary bypass graft without angina pectoris, unspecified whether native or transplanted heart: Coronary artery disease involving coronary bypass graft without angina pectoris, unspecified whether native or transplanted heart  ESRD (end stage renal disease): ESRD (end stage renal disease)  Pneumonia: Pneumonia

## 2018-02-13 NOTE — PROGRESS NOTE ADULT - SUBJECTIVE AND OBJECTIVE BOX
KEE GARCIA  86y  Male      Patient is a 86y old  Male who presents with a chief complaint of Cough, Fevers, Lethargy x 2 days (11 Feb 2018 22:07)    HPI:  This is an 87 YO M, PMH ESRD on HD, CAD s/p CABG 2016 and PCI 2017, AFib, recurrent pleural effusions, DM, presents from [facility] with 2 days of lethargy, subjective fevers, low BP, cough, and hypoxia 78% on 3-4 liters NC. He is a poor historian, cantonese speaking, family at bedside to interpret, but patient is currently on BiPAP and difficulty answering questions. Denies pain or discomfort at this time. Per daughter, patient was on Tamiflu last month as prophylaxis due to outbreak at NH.    MEDICATIONS GIVEN IN ED:  Cefepime, Azithromycin    VITALS:   Vital Signs Last 24 Hrs  T(C): 38.7 (11 Feb 2018 16:20), Max: 38.7 (11 Feb 2018 16:20)  T(F): 101.6 (11 Feb 2018 16:20), Max: 101.6 (11 Feb 2018 16:20)  HR: 82 (11 Feb 2018 18:03) (81 - 84)  BP: 110/54 (11 Feb 2018 18:03) (90/57 - 121/56)  BP(mean): --  RR: 18 (11 Feb 2018 18:03) (18 - 18)  SpO2: 100% (11 Feb 2018 18:03) (100% - 100%) (11 Feb 2018 22:07)      Interval events: no acute event over night      REVIEW OF SYSTEMS:  CONSTITUTIONAL: No fever, weight loss, or fatigue  RESPIRATORY: intermittent cough, sob at rest   CARDIOVASCULAR: No chest pain, palpitations, dizziness, or leg swelling  GASTROINTESTINAL: No abdominal or epigastric pain. No nausea, vomiting, or hematemesis; No diarrhea or constipation. No melena or hematochezia.  NEUROLOGICAL: No headaches  MUSCULOSKELETAL: swelling in the left upper extremity sloughing  on the left gillian, and the rt ankle    T(C): 35.6 (02-13-18 @ 11:30), Max: 36.1 (02-13-18 @ 05:30)  HR: 76 (02-13-18 @ 11:30) (72 - 81)  BP: 106/55 (02-13-18 @ 11:30) (106/55 - 139/61)  RR: 20 (02-13-18 @ 11:30) (18 - 20)  SpO2: 98% (02-13-18 @ 06:46) (98% - 98%)  Wt(kg): --Vital Signs Last 24 Hrs  T(C): 35.6 (13 Feb 2018 11:30), Max: 36.1 (13 Feb 2018 05:30)  T(F): 96.1 (13 Feb 2018 11:30), Max: 96.9 (13 Feb 2018 05:30)  HR: 76 (13 Feb 2018 11:30) (72 - 81)  BP: 106/55 (13 Feb 2018 11:30) (106/55 - 139/61)  BP(mean): --  RR: 20 (13 Feb 2018 11:30) (18 - 20)  SpO2: 98% (13 Feb 2018 06:46) (98% - 98%)    PHYSICAL EXAM:  GENERAL APPEARANCE:  alert and cooperative, and appears to be in no acute distress.  HEENT: unremarkable  CARDIAC: irregular  S1 and S2.  LUNGS: decrease air entry at right base, ronchi  ABDOMEN: Positive bowel sounds. Soft, nondistended, nontender. No guarding or rebound. No HSM.  MUSKULOSKELETAL: swelling in the left upper extremity sloughing  on the left gillian, and the rt ankle  EXTREMITIES: No edema. Peripheral pulses intact. No varicosities.  NEUROLOGICAL: CN II-XII intact. Strength and sensation symmetric and intact throughout. Reflexes 2+ throughout. Cerebellar testing normal.  SKIN: Skin normal color, texture and turgor with no lesions or eruptions.         LABS:    02-13    137  |  94<L>  |  52<H>  ----------------------------<  170<H>  4.9   |  27  |  4.2<HH>    Ca    8.4<L>      13 Feb 2018 07:38  Mg     2.7     02-13    TPro  5.5<L>  /  Alb  2.1<L>  /  TBili  0.7  /  DBili  x   /  AST  19  /  ALT  15  /  AlkPhos  126<H>  02-13    PT/INR - ( 11 Feb 2018 15:53 )   PT: 11.00 sec;   INR: 1.02 ratio         PTT - ( 11 Feb 2018 15:53 )  PTT:31.9 sec    RADIOLOGY & ADDITIONAL TESTS:    Imaging Personally Reviewed:  [ ] YES  [ ] NO      acetaminophen  Suppository 650 milliGRAM(s) Rectal every 6 hours PRN  ALBUTerol/ipratropium for Nebulization 3 milliLiter(s) Nebulizer every 6 hours  amiodarone    Tablet 100 milliGRAM(s) Oral two times a day  aspirin enteric coated 81 milliGRAM(s) Oral daily  atorvastatin 40 milliGRAM(s) Oral at bedtime  BACItracin   Ointment 1 Application(s) Topical two times a day  cefepime  IVPB      cefepime  IVPB 500 milliGRAM(s) IV Intermittent every 24 hours  clopidogrel Tablet 75 milliGRAM(s) Oral daily  docusate sodium 100 milliGRAM(s) Oral two times a day  heparin  Injectable 5000 Unit(s) SubCutaneous every 8 hours  methylPREDNISolone sodium succinate Injectable 125 milliGRAM(s) IV Push every 8 hours  oxyCODONE    IR 5 milliGRAM(s) Oral three times a day PRN  pantoprazole   Suspension 40 milliGRAM(s) Oral before breakfast  senna 2 Tablet(s) Oral at bedtime  sevelamer hydrochloride 800 milliGRAM(s) Oral three times a day with meals  vancomycin  IVPB 500 milliGRAM(s) IV Intermittent every 72 hours  vitamin A &amp; D Ointment 1 Application(s) Topical two times a day

## 2018-02-14 LAB
ANION GAP SERPL CALC-SCNC: 11 MMOL/L — SIGNIFICANT CHANGE UP (ref 7–14)
BASOPHILS # BLD AUTO: 0.01 K/UL — SIGNIFICANT CHANGE UP (ref 0–0.2)
BASOPHILS NFR BLD AUTO: 0.1 % — SIGNIFICANT CHANGE UP (ref 0–1)
BUN SERPL-MCNC: 76 MG/DL — CRITICAL HIGH (ref 10–20)
CALCIUM SERPL-MCNC: 8.6 MG/DL — SIGNIFICANT CHANGE UP (ref 8.5–10.1)
CHLORIDE SERPL-SCNC: 96 MMOL/L — LOW (ref 98–110)
CO2 SERPL-SCNC: 28 MMOL/L — SIGNIFICANT CHANGE UP (ref 17–32)
CREAT SERPL-MCNC: 5.6 MG/DL — CRITICAL HIGH (ref 0.7–1.5)
EOSINOPHIL # BLD AUTO: 0 K/UL — SIGNIFICANT CHANGE UP (ref 0–0.7)
EOSINOPHIL NFR BLD AUTO: 0 % — SIGNIFICANT CHANGE UP (ref 0–8)
GLUCOSE SERPL-MCNC: 167 MG/DL — HIGH (ref 70–110)
HCT VFR BLD CALC: 27.2 % — LOW (ref 42–52)
HGB BLD-MCNC: 8 G/DL — LOW (ref 14–18)
IMM GRANULOCYTES NFR BLD AUTO: 0.8 % — HIGH (ref 0.1–0.3)
IRON SATN MFR SERPL: 21 % — SIGNIFICANT CHANGE UP (ref 15–50)
IRON SATN MFR SERPL: 42 UG/DL — SIGNIFICANT CHANGE UP (ref 35–150)
LYMPHOCYTES # BLD AUTO: 0.19 K/UL — LOW (ref 1.2–3.4)
LYMPHOCYTES # BLD AUTO: 1.4 % — LOW (ref 20.5–51.1)
MCHC RBC-ENTMCNC: 26.1 PG — LOW (ref 27–31)
MCHC RBC-ENTMCNC: 29.4 G/DL — LOW (ref 32–37)
MCV RBC AUTO: 88.9 FL — SIGNIFICANT CHANGE UP (ref 80–94)
MONOCYTES # BLD AUTO: 0.33 K/UL — SIGNIFICANT CHANGE UP (ref 0.1–0.6)
MONOCYTES NFR BLD AUTO: 2.4 % — SIGNIFICANT CHANGE UP (ref 1.7–9.3)
NEUTROPHILS # BLD AUTO: 13.28 K/UL — HIGH (ref 1.4–6.5)
NEUTROPHILS NFR BLD AUTO: 95.3 % — HIGH (ref 42.2–75.2)
NRBC # BLD: 0 /100 WBCS — SIGNIFICANT CHANGE UP (ref 0–0)
PLATELET # BLD AUTO: 173 K/UL — SIGNIFICANT CHANGE UP (ref 130–400)
POTASSIUM SERPL-MCNC: 5.6 MMOL/L — HIGH (ref 3.5–5)
POTASSIUM SERPL-SCNC: 5.6 MMOL/L — HIGH (ref 3.5–5)
RAPID RVP RESULT: SIGNIFICANT CHANGE UP
RBC # BLD: 3.06 M/UL — LOW (ref 4.7–6.1)
RBC # FLD: 19.7 % — HIGH (ref 11.5–14.5)
SODIUM SERPL-SCNC: 135 MMOL/L — SIGNIFICANT CHANGE UP (ref 135–146)
TIBC SERPL-MCNC: 199 UG/ML — LOW (ref 260–400)
UIBC SERPL-MCNC: SIGNIFICANT CHANGE UP UG/DL (ref 110–370)
VANCOMYCIN TROUGH SERPL-MCNC: 7.9 UG/ML — SIGNIFICANT CHANGE UP (ref 5–10)
WBC # BLD: 13.92 K/UL — HIGH (ref 4.8–10.8)
WBC # FLD AUTO: 13.92 K/UL — HIGH (ref 4.8–10.8)

## 2018-02-14 RX ORDER — VANCOMYCIN HCL 1 G
500 VIAL (EA) INTRAVENOUS
Qty: 0 | Refills: 0 | Status: DISCONTINUED | OUTPATIENT
Start: 2018-02-14 | End: 2018-02-14

## 2018-02-14 RX ORDER — INSULIN LISPRO 100/ML
4 VIAL (ML) SUBCUTANEOUS ONCE
Qty: 0 | Refills: 0 | Status: COMPLETED | OUTPATIENT
Start: 2018-02-14 | End: 2018-02-14

## 2018-02-14 RX ORDER — VANCOMYCIN HCL 1 G
500 VIAL (EA) INTRAVENOUS
Qty: 0 | Refills: 0 | Status: DISCONTINUED | OUTPATIENT
Start: 2018-02-14 | End: 2018-02-16

## 2018-02-14 RX ADMIN — Medication 40 MILLIGRAM(S): at 05:50

## 2018-02-14 RX ADMIN — Medication 1 APPLICATION(S): at 18:01

## 2018-02-14 RX ADMIN — SENNA PLUS 2 TABLET(S): 8.6 TABLET ORAL at 22:35

## 2018-02-14 RX ADMIN — Medication 4 UNIT(S): at 23:11

## 2018-02-14 RX ADMIN — CEFEPIME 100 MILLIGRAM(S): 1 INJECTION, POWDER, FOR SOLUTION INTRAMUSCULAR; INTRAVENOUS at 23:09

## 2018-02-14 RX ADMIN — Medication 1 APPLICATION(S): at 05:49

## 2018-02-14 RX ADMIN — PANTOPRAZOLE SODIUM 40 MILLIGRAM(S): 20 TABLET, DELAYED RELEASE ORAL at 06:02

## 2018-02-14 RX ADMIN — Medication 1 APPLICATION(S): at 06:01

## 2018-02-14 RX ADMIN — Medication 40 MILLIGRAM(S): at 16:42

## 2018-02-14 RX ADMIN — ATORVASTATIN CALCIUM 40 MILLIGRAM(S): 80 TABLET, FILM COATED ORAL at 22:35

## 2018-02-14 RX ADMIN — AMIODARONE HYDROCHLORIDE 100 MILLIGRAM(S): 400 TABLET ORAL at 05:48

## 2018-02-14 RX ADMIN — SEVELAMER CARBONATE 800 MILLIGRAM(S): 2400 POWDER, FOR SUSPENSION ORAL at 08:21

## 2018-02-14 RX ADMIN — CLOPIDOGREL BISULFATE 75 MILLIGRAM(S): 75 TABLET, FILM COATED ORAL at 11:33

## 2018-02-14 RX ADMIN — Medication 3 MILLILITER(S): at 08:00

## 2018-02-14 RX ADMIN — Medication 40 MILLIGRAM(S): at 22:37

## 2018-02-14 RX ADMIN — HEPARIN SODIUM 5000 UNIT(S): 5000 INJECTION INTRAVENOUS; SUBCUTANEOUS at 22:36

## 2018-02-14 RX ADMIN — Medication 100 MILLIGRAM(S): at 17:58

## 2018-02-14 RX ADMIN — Medication 81 MILLIGRAM(S): at 11:33

## 2018-02-14 RX ADMIN — Medication 100 MILLIGRAM(S): at 14:18

## 2018-02-14 RX ADMIN — HEPARIN SODIUM 5000 UNIT(S): 5000 INJECTION INTRAVENOUS; SUBCUTANEOUS at 16:41

## 2018-02-14 RX ADMIN — HEPARIN SODIUM 5000 UNIT(S): 5000 INJECTION INTRAVENOUS; SUBCUTANEOUS at 05:50

## 2018-02-14 RX ADMIN — SEVELAMER CARBONATE 800 MILLIGRAM(S): 2400 POWDER, FOR SUSPENSION ORAL at 11:33

## 2018-02-14 RX ADMIN — AMIODARONE HYDROCHLORIDE 100 MILLIGRAM(S): 400 TABLET ORAL at 17:59

## 2018-02-14 RX ADMIN — Medication 100 MILLIGRAM(S): at 05:49

## 2018-02-14 NOTE — PROGRESS NOTE ADULT - ATTENDING COMMENTS
1. Cough fever lethargy hypoxia Sepsis hypotension 2/2 Pneumonia       Opacities on CXR, elevaeted WBC      c/w Vancomycin, Cefepime started at NH      FU Blood, Urine, Sputum cultures, MRSA swabs; r/o / Influenza (on contact precautions)     Tylenol for fevers     c/w w/ BIPAP, O2, Nebulizers, Solumedrol       Pt is DNR but not DNI, intubate if resp decompensation     Hold BP meds      2.  ESRD       FU labs      HD  M/W/F      Renal f/u      c/w Renvela     3.  CAD       c/w ASA, Plavix, Lipitor      4.   Chronic atrial fibrillation/ HTN       Not on AC due to fall risk       c/w Amiodarone, hold nifedipine, enalapril.     5.  DM2       Follow up FS       Diet controlled, start insulin if FS elevated    6. PPx: Heparin, Protonix

## 2018-02-14 NOTE — PROGRESS NOTE ADULT - PROBLEM SELECTOR PROBLEM 3
Coronary artery disease involving coronary bypass graft without angina pectoris, unspecified whether native or transplanted heart

## 2018-02-14 NOTE — PROGRESS NOTE ADULT - SUBJECTIVE AND OBJECTIVE BOX
Nephrology progress note    Patient is seen and examined, events over the last 24 h noted .    Allergies:  No Known Allergies    Hospital Medications:   MEDICATIONS  (STANDING):  ALBUTerol/ipratropium for Nebulization 3 milliLiter(s) Nebulizer every 6 hours  amiodarone    Tablet 100 milliGRAM(s) Oral two times a day  aspirin enteric coated 81 milliGRAM(s) Oral daily  atorvastatin 40 milliGRAM(s) Oral at bedtime  BACItracin   Ointment 1 Application(s) Topical two times a day  cefepime  IVPB 500 milliGRAM(s) IV Intermittent every 24 hours  cefepime  IVPB      clopidogrel Tablet 75 milliGRAM(s) Oral daily  docusate sodium 100 milliGRAM(s) Oral two times a day  heparin  Injectable 5000 Unit(s) SubCutaneous every 8 hours  methylPREDNISolone sodium succinate Injectable 40 milliGRAM(s) IV Push every 8 hours  pantoprazole   Suspension 40 milliGRAM(s) Oral before breakfast  senna 2 Tablet(s) Oral at bedtime  sevelamer hydrochloride 800 milliGRAM(s) Oral three times a day with meals  vancomycin  IVPB 500 milliGRAM(s) IV Intermittent every 48 hours  vitamin A &amp; D Ointment 1 Application(s) Topical two times a day        VITALS:  T(F): 96.8 (02-14-18 @ 05:21), Max: 97.5 (02-13-18 @ 19:55)  HR: 77 (02-14-18 @ 05:21)  BP: 104/52 (02-14-18 @ 05:21)  RR: 17 (02-14-18 @ 05:21)  SpO2: 97% (02-13-18 @ 22:00)  Wt(kg): --    02-12 @ 07:01  -  02-13 @ 07:00  --------------------------------------------------------  IN: 0 mL / OUT: 2000 mL / NET: -2000 mL    02-13 @ 07:01  -  02-14 @ 07:00  --------------------------------------------------------  IN: 200 mL / OUT: 0 mL / NET: 200 mL          PHYSICAL EXAM:  Constitutional: NAD  HEENT: anicteric sclera, oropharynx clear, MMM  Neck: No JVD  Respiratory: CTAB, no wheezes, rales or rhonchi  Cardiovascular: S1, S2, RRR  Gastrointestinal: BS+, soft, NT/ND  Extremities: No cyanosis or clubbing. No peripheral edema  Neurological: A/O x 3, no focal deficits  : No CVA tenderness. No mcclain.   Skin: No rashes  Vascular Access:    LABS:  02-13    137  |  94<L>  |  52<H>  ----------------------------<  170<H>  4.9   |  27  |  4.2<HH>    Ca    8.4<L>      13 Feb 2018 07:38  Mg     2.7     02-13    TPro  5.5<L>  /  Alb  2.1<L>  /  TBili  0.7  /  DBili      /  AST  19  /  ALT  15  /  AlkPhos  126<H>  02-13                        8.0    13.92 )-----------( 173      ( 14 Feb 2018 08:11 )             27.2     Creatinine Trend: 4.2<--, 5.5<--      Urine Studies:      RADIOLOGY & ADDITIONAL STUDIES: Nephrology progress note    Patient is seen and examined, events over the last 24 h noted .    Allergies:  No Known Allergies    Hospital Medications:   MEDICATIONS  (STANDING):  ALBUTerol/ipratropium for Nebulization 3 milliLiter(s) Nebulizer every 6 hours  amiodarone    Tablet 100 milliGRAM(s) Oral two times a day  aspirin enteric coated 81 milliGRAM(s) Oral daily  atorvastatin 40 milliGRAM(s) Oral at bedtime  BACItracin   Ointment 1 Application(s) Topical two times a day  cefepime  IVPB 500 milliGRAM(s) IV Intermittent every 24 hours  cefepime  IVPB      clopidogrel Tablet 75 milliGRAM(s) Oral daily  docusate sodium 100 milliGRAM(s) Oral two times a day  heparin  Injectable 5000 Unit(s) SubCutaneous every 8 hours  methylPREDNISolone sodium succinate Injectable 40 milliGRAM(s) IV Push every 8 hours  pantoprazole   Suspension 40 milliGRAM(s) Oral before breakfast  senna 2 Tablet(s) Oral at bedtime  sevelamer hydrochloride 800 milliGRAM(s) Oral three times a day with meals  vancomycin  IVPB 500 milliGRAM(s) IV Intermittent every 48 hours  vitamin A &amp; D Ointment 1 Application(s) Topical two times a day        VITALS:  T(F): 96.8 (02-14-18 @ 05:21), Max: 97.5 (02-13-18 @ 19:55)  HR: 77 (02-14-18 @ 05:21)  BP: 104/52 (02-14-18 @ 05:21)  RR: 17 (02-14-18 @ 05:21)  SpO2: 97% (02-13-18 @ 22:00)  Wt(kg): --    02-12 @ 07:01  -  02-13 @ 07:00  --------------------------------------------------------  IN: 0 mL / OUT: 2000 mL / NET: -2000 mL    02-13 @ 07:01  -  02-14 @ 07:00  --------------------------------------------------------  IN: 200 mL / OUT: 0 mL / NET: 200 mL          PHYSICAL EXAM:  Constitutional: NAD  HEENT: anicteric sclera, oropharynx clear, MMM  Neck: No JVD  Respiratory: CTAB, no wheezes, rales or rhonchi  Cardiovascular: S1, S2, RRR  Gastrointestinal: BS+, soft, NT/ND  Extremities: No cyanosis or clubbing. No peripheral edema  Neurological: A/O x 3, no focal deficits  : No CVA tenderness. No mcclain.   Skin: No rashes  Vascular Access:    LABS:  02-13    137  |  94<L>  |  52<H>  ----------------------------<  170<H>  4.9   |  27  |  4.2<HH>    Ca    8.4<L>      13 Feb 2018 07:38  Mg     2.7     02-13    TPro  5.5<L>  /  Alb  2.1<L>  /  TBili  0.7  /  DBili      /  AST  19  /  ALT  15  /  AlkPhos  126<H>  02-13                        8.0    13.92 )-----------( 173      ( 14 Feb 2018 08:11 )             27.2     Creatinine Trend: 4.2<--, 5.5<--      Urine Studies:      RADIOLOGY & ADDITIONAL STUDIES:  < from: Xray Chest 1 View- PORTABLE-Routine (02.13.18 @ 07:55) >  Impression:      Stable pulmonary vascular congestion with large right and small left   pleural effusions with adjacent opacities.     CAROLANN CORNELL, ATTENDING RADIOLOGIST  This document has been electronically signed. Feb 13 2018  8:35AM        < end of copied text > Nephrology progress note    Patient is seen and examined, no acute events over the last 24 h noted .  SOB (+), no fever/chills, CP, abd pain, and diarrhea.    Allergies:  No Known Allergies    Hospital Medications:   MEDICATIONS  (STANDING):  ALBUTerol/ipratropium for Nebulization 3 milliLiter(s) Nebulizer every 6 hours  amiodarone    Tablet 100 milliGRAM(s) Oral two times a day  aspirin enteric coated 81 milliGRAM(s) Oral daily  atorvastatin 40 milliGRAM(s) Oral at bedtime  BACItracin   Ointment 1 Application(s) Topical two times a day  cefepime  IVPB 500 milliGRAM(s) IV Intermittent every 24 hours  cefepime  IVPB      clopidogrel Tablet 75 milliGRAM(s) Oral daily  docusate sodium 100 milliGRAM(s) Oral two times a day  heparin  Injectable 5000 Unit(s) SubCutaneous every 8 hours  methylPREDNISolone sodium succinate Injectable 40 milliGRAM(s) IV Push every 8 hours  pantoprazole   Suspension 40 milliGRAM(s) Oral before breakfast  senna 2 Tablet(s) Oral at bedtime  sevelamer hydrochloride 800 milliGRAM(s) Oral three times a day with meals  vancomycin  IVPB 500 milliGRAM(s) IV Intermittent every 48 hours  vitamin A &amp; D Ointment 1 Application(s) Topical two times a day    VITALS:  T(F): 96.8 (02-14-18 @ 05:21), Max: 97.5 (02-13-18 @ 19:55)  HR: 77 (02-14-18 @ 05:21)  BP: 104/52 (02-14-18 @ 05:21)  RR: 17 (02-14-18 @ 05:21)  SpO2: 97% (02-13-18 @ 22:00)    02-12 @ 07:01  -  02-13 @ 07:00  --------------------------------------------------------  IN: 0 mL / OUT: 2000 mL / NET: -2000 mL    02-13 @ 07:01  -  02-14 @ 07:00  --------------------------------------------------------  IN: 200 mL / OUT: 0 mL / NET: 200 mL          PHYSICAL EXAM:  Constitutional: NAD  Neck: No JVD  Respiratory: (+) rhonchi  Cardiovascular: S1, S2, RRR  Gastrointestinal: BS+, soft, NT/ND  Extremities: mild leg edema  Vascular Access: left upper arm fistula    LABS:  02-13    137  |  94<L>  |  52<H>  ----------------------------<  170<H>  4.9   |  27  |  4.2<HH>    Ca    8.4<L>      13 Feb 2018 07:38  Mg     2.7     02-13    TPro  5.5<L>  /  Alb  2.1<L>  /  TBili  0.7  /  DBili      /  AST  19  /  ALT  15  /  AlkPhos  126<H>  02-13                        8.0    13.92 )-----------( 173      ( 14 Feb 2018 08:11 )             27.2     Creatinine Trend: 4.2<--, 5.5<--      Rapid Respiratory Viral Panel (02.13.18 @ 16:08)    Rapid RVP Result: NotDete: This Respiratory Panel uses polymerase chain reaction (PCR) to detect for  adenovirus; coronavirus (HKU1, NL63, 229E, OC43); human metapneumovirus  (hMPV); human enterovirus/rhinovirus (Entero/RV); influenza A; influenza  A/H1; influenza A/H3; influenza A/H1-2009; influenza B; parainfluenza  viruses 1, 2, 3, 4; respiratory syncytial virus; Mycoplasma pneumoniae;  and Chlamydophila pneumoniae.    Urine Studies:      RADIOLOGY & ADDITIONAL STUDIES:  < from: Xray Chest 1 View- PORTABLE-Routine (02.13.18 @ 07:55) >  Impression:      Stable pulmonary vascular congestion with large right and small left   pleural effusions with adjacent opacities.     CAROLANN CORNELL, ATTENDING RADIOLOGIST  This document has been electronically signed. Feb 13 2018  8:35AM        < end of copied text > Nephrology progress note    Patient is seen and examined on HD no acute events over the last 24 h noted .  SOB (+), no fever/chills, CP, abd pain, and diarrhea.  Has low BP during HD     Allergies:  No Known Allergies    Hospital Medications:   MEDICATIONS  (STANDING):  ALBUTerol/ipratropium for Nebulization 3 milliLiter(s) Nebulizer every 6 hours  amiodarone    Tablet 100 milliGRAM(s) Oral two times a day  aspirin enteric coated 81 milliGRAM(s) Oral daily  atorvastatin 40 milliGRAM(s) Oral at bedtime  BACItracin   Ointment 1 Application(s) Topical two times a day  cefepime  IVPB 500 milliGRAM(s) IV Intermittent every 24 hours  cefepime  IVPB      clopidogrel Tablet 75 milliGRAM(s) Oral daily  docusate sodium 100 milliGRAM(s) Oral two times a day  heparin  Injectable 5000 Unit(s) SubCutaneous every 8 hours  methylPREDNISolone sodium succinate Injectable 40 milliGRAM(s) IV Push every 8 hours  pantoprazole   Suspension 40 milliGRAM(s) Oral before breakfast  senna 2 Tablet(s) Oral at bedtime  sevelamer hydrochloride 800 milliGRAM(s) Oral three times a day with meals  vancomycin  IVPB 500 milliGRAM(s) IV Intermittent every 48 hours  vitamin A &amp; D Ointment 1 Application(s) Topical two times a day    VITALS:  T(F): 96.8 (02-14-18 @ 05:21), Max: 97.5 (02-13-18 @ 19:55)  HR: 77 (02-14-18 @ 05:21)  BP: 104/52 (02-14-18 @ 05:21)  RR: 17 (02-14-18 @ 05:21)  SpO2: 97% (02-13-18 @ 22:00)    02-12 @ 07:01  -  02-13 @ 07:00  --------------------------------------------------------  IN: 0 mL / OUT: 2000 mL / NET: -2000 mL    02-13 @ 07:01  -  02-14 @ 07:00  --------------------------------------------------------  IN: 200 mL / OUT: 0 mL / NET: 200 mL          PHYSICAL EXAM:  Constitutional: NAD  Neck: No JVD  Respiratory: (+) rhonchi  Cardiovascular: S1, S2, RRR  Gastrointestinal: BS+, soft, NT/ND  Extremities: mild leg edema  Vascular Access: left upper arm fistula    LABS:  02-13    137  |  94<L>  |  52<H>  ----------------------------<  170<H>  4.9   |  27  |  4.2<HH>    Ca    8.4<L>      13 Feb 2018 07:38  Mg     2.7     02-13    TPro  5.5<L>  /  Alb  2.1<L>  /  TBili  0.7  /  DBili      /  AST  19  /  ALT  15  /  AlkPhos  126<H>  02-13                        8.0    13.92 )-----------( 173      ( 14 Feb 2018 08:11 )             27.2     Creatinine Trend: 4.2<--, 5.5<--      Rapid Respiratory Viral Panel (02.13.18 @ 16:08)      Rapid RVP Result: NotDetected           RADIOLOGY & ADDITIONAL STUDIES:  < from: Xray Chest 1 View- PORTABLE-Routine (02.13.18 @ 07:55) >  Impression:      Stable pulmonary vascular congestion with large right and small left   pleural effusions with adjacent opacities.     < end of copied text >

## 2018-02-14 NOTE — PROGRESS NOTE ADULT - PROBLEM SELECTOR PLAN 6
follow up duplex left upper extremity  #sloughing on ankle and left forearm , consult burn
Follow up duplex left upper extremity  #sloughing on ankle and left forearm , Duplex normal

## 2018-02-14 NOTE — PROGRESS NOTE ADULT - PROBLEM SELECTOR PLAN 1
High risk for drug resistant organisms due to ESRD and NH resident  , CXR shows opacities, high WBC  - c/w Vancomycin, Cefepime  - FU Blood, Urine, Sputum cultures, MRSA swab  -Pt refusing BIPAP, O2, Nebulizers, Solumedrol (takes prednisone 5mg at home, unknown why)  - Slight pleural effusion Pulm crit following the patient   - DNR but not DNI, intubate if resp decompensation

## 2018-02-14 NOTE — PROGRESS NOTE ADULT - PROBLEM SELECTOR PLAN 4
- Not on AC due to fall risk  - c/w Amiodarone, hold nifedipine and enalapril. Can be restarted if BP and heart rate is not controlled
- Not on AC due to fall risk  - c/w Amiodarone, hold nifedipine, enalapril

## 2018-02-14 NOTE — PROGRESS NOTE ADULT - PROBLEM SELECTOR PLAN 2
- Renal following  - HD as needed; M/W/F    - c/w Renvela  - Regular nephrologist is Dr. Santiago
- FU labs  - HD as needed; M/W/F  - FU renal recs  - c/w Renvela  - Regular nephrologist is Dr. Santaigo
- Renal following  - HD as needed; M/W/F    - c/w Renvela  - Regular nephrologist is Dr. Santiago
- Renal following  - HD as needed; M/W/F    - c/w Renvela  - Regular nephrologist is Dr. Santiago

## 2018-02-14 NOTE — PROGRESS NOTE ADULT - PROBLEM SELECTOR PLAN 5
- Follow up FS  - Diet controlled, start insulin if FS>180  - PPx: Heparin, Protonix  - DNR, not DNI  - From NH, discharge when stable back to [facility]  - Contact Daughter Faviola Nunez at [phone]
- Follow up FS  - Diet controlled, start insulin if FS elevated  - PPx: Heparin, Protonix  - DNR, not DNI  - From NH, discharge when stable back to [facility]  - Contact Daughter Faviola Nunez at [phone]
- Follow up FS  - Diet controlled, start insulin if FS>180  - PPx: Heparin, Protonix  - DNR, not DNI  - From NH, discharge when stable back to [facility]  - Contact Daughter Faviola Nunez at [phone]
- Follow up FS  - Diet controlled, start insulin if FS>180  - PPx: Heparin, Protonix  - DNR, not DNI  - From NH, discharge when stable back to [facility]  - Contact Daughter Faviola Nunez at [phone]

## 2018-02-14 NOTE — PROGRESS NOTE ADULT - PROBLEM SELECTOR PROBLEM 5
Type 2 diabetes mellitus with chronic kidney disease on chronic dialysis, without long-term current use of insulin

## 2018-02-14 NOTE — PROGRESS NOTE ADULT - ASSESSMENT
# ESRD on HD for HD today   -3h /opti 160 / 2K /UF 3 l as tolerated/ left upper arm fistula    # PNA?  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  - will start aranesp 20 mg iv posthemo qw.  - monitor CBC closely. 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  - will start aranesp 20 mg iv posthemo qweek  - monitor CBC closely.

## 2018-02-14 NOTE — PROVIDER CONTACT NOTE (OTHER) - SITUATION
MD Prabhakar, Terrence [phone] made aware of pt's fingerstick of [phone]. Awaiting interventions at this time. WIll continue to monitor

## 2018-02-14 NOTE — PROGRESS NOTE ADULT - SUBJECTIVE AND OBJECTIVE BOX
Patient is a 86y old  Male who presents with a chief complaint of Cough, Fevers, Lethargy x 2 days (11 Feb 2018 22:07)      PAST MEDICAL & SURGICAL HISTORY:  Hypertension, unspecified type  Chronic obstructive pulmonary disease, unspecified COPD type  Atrial fibrillation  Pleural effusion due to another disorder  Diabetes  Coronary artery disease involving coronary bypass graft without angina pectoris, unspecified whether native or transplanted heart  Hemodialysis access, fistula mature  ESRD (end stage renal disease)  A-V fistula  S/P CABG x 3      MEDICATIONS  (STANDING):  ALBUTerol/ipratropium for Nebulization 3 milliLiter(s) Nebulizer every 6 hours  amiodarone    Tablet 100 milliGRAM(s) Oral two times a day  aspirin enteric coated 81 milliGRAM(s) Oral daily  atorvastatin 40 milliGRAM(s) Oral at bedtime  BACItracin   Ointment 1 Application(s) Topical two times a day  cefepime  IVPB 500 milliGRAM(s) IV Intermittent every 24 hours  cefepime  IVPB      clopidogrel Tablet 75 milliGRAM(s) Oral daily  docusate sodium 100 milliGRAM(s) Oral two times a day  heparin  Injectable 5000 Unit(s) SubCutaneous every 8 hours  methylPREDNISolone sodium succinate Injectable 40 milliGRAM(s) IV Push every 8 hours  pantoprazole   Suspension 40 milliGRAM(s) Oral before breakfast  senna 2 Tablet(s) Oral at bedtime  sevelamer hydrochloride 800 milliGRAM(s) Oral three times a day with meals  vitamin A &amp; D Ointment 1 Application(s) Topical two times a day    MEDICATIONS  (PRN):  acetaminophen  Suppository 650 milliGRAM(s) Rectal every 6 hours PRN For Temp greater than 38 C (100.4 F)  oxyCODONE    IR 5 milliGRAM(s) Oral three times a day PRN Moderate Pain (4 - 6)  vancomycin  IVPB 500 milliGRAM(s) IV Intermittent <User Schedule> PRN dialysis      Allergy:  No Known Allergies      Overnight events:    Vital Signs Last 24 Hrs  T(C): 36 (14 Feb 2018 05:21), Max: 36.4 (13 Feb 2018 19:55)  T(F): 96.8 (14 Feb 2018 05:21), Max: 97.5 (13 Feb 2018 19:55)  HR: 74 (14 Feb 2018 15:18) (74 - 78)  BP: 113/48 (14 Feb 2018 15:18) (100/43 - 113/48)  BP(mean): --  RR: 18 (14 Feb 2018 15:18) (17 - 20)  SpO2: 100% (14 Feb 2018 15:18) (97% - 100%)  CAPILLARY BLOOD GLUCOSE  224 (14 Feb 2018 11:00)  170 (14 Feb 2018 07:12)  208 (13 Feb 2018 21:20)    PHYSICAL EXAM:  GENERAL APPEARANCE:  alert and cooperative, and appears to be in no acute distress.  HEENT: unremarkable  CARDIAC: irregular S1 and S2.  LUNGS: decrease air entry at right base, ronchi  ABDOMEN: Positive bowel sounds. Soft, nondistended, nontender. No guarding or rebound. No HSM.  MUSKULOSKELETAL: Swelling in the left upper extremity sloughing  on the left gillian, and the rt ankle  EXTREMITIES: Edema on the left upper extremity         Labs:                        8.0    13.92 )-----------( 173      ( 14 Feb 2018 08:11 )             27.2             02-14    135  |  96<L>  |  76<HH>  ----------------------------<  167<H>  5.6<H>   |  28  |  5.6<HH>    Ca    8.6      14 Feb 2018 08:11  Mg     2.7     02-13    TPro  5.5<L>  /  Alb  2.1<L>  /  TBili  0.7  /  DBili  x   /  AST  19  /  ALT  15  /  AlkPhos  126<H>  02-13    LIVER FUNCTIONS - ( 13 Feb 2018 07:38 )  Alb: 2.1 g/dL / Pro: 5.5 g/dL / ALK PHOS: 126 U/L / ALT: 15 U/L / AST: 19 U/L / GGT: x                               Imaging:  < from: Xray Chest 1 View- PORTABLE-Routine (02.13.18 @ 07:55) >  Stable pulmonary vascular congestion with large right and small left   pleural effusions with adjacent opacities.

## 2018-02-14 NOTE — PROGRESS NOTE ADULT - SUBJECTIVE AND OBJECTIVE BOX
KEE GARCIA  86y  Male    Patient is a 86y old  Male who presents with a chief complaint of Cough, Fevers, Lethargy x 2 days (11 Feb 2018 22:07)      INTERVAL HPI/OVERNIGHT EVENTS:    T(C): 36.4 (02-13-18 @ 19:55), Max: 36.4 (02-13-18 @ 19:55)  HR: 78 (02-13-18 @ 19:55) (76 - 80)  BP: 109/53 (02-13-18 @ 19:55) (106/55 - 116/16)  RR: 20 (02-13-18 @ 19:55) (18 - 20)  SpO2: 97% (02-13-18 @ 22:00) (97% - 98%)  Wt(kg): --Vital Signs Last 24 Hrs  T(C): 36.4 (13 Feb 2018 19:55), Max: 36.4 (13 Feb 2018 19:55)  T(F): 97.5 (13 Feb 2018 19:55), Max: 97.5 (13 Feb 2018 19:55)  HR: 78 (13 Feb 2018 19:55) (76 - 80)  BP: 109/53 (13 Feb 2018 19:55) (106/55 - 116/16)  BP(mean): --  RR: 20 (13 Feb 2018 19:55) (18 - 20)  SpO2: 97% (13 Feb 2018 22:00) (97% - 98%)    PHYSICAL EXAM:  GENERAL: NAD, well-groomed, well-developed  HEAD:  Atraumatic, Normocephalic  NECK: Supple, No JVD, Normal thyroid  NERVOUS SYSTEM:  Alert & Oriented X3, Good concentration; Motor Strength 5/5 B/L upper and lower extremities; DTRs 2+ intact and symmetric  CHEST/LUNG: Clear to percussion bilaterally; No rales, rhonchi, wheezing, or rubs  HEART: Regular rate and rhythm; No murmurs, rubs, or gallops  ABDOMEN: Soft, Nontender, Nondistended; Bowel sounds present  EXTREMITIES:  2+ Peripheral Pulses, No clubbing, cyanosis, or edema    Consultant(s) Notes Reviewed:  [x ] YES  [ ] NO    Discussed with Consultants/Other Providers/Residents [ x] YES     LABS                         7.8    18.84 )-----------( 153      ( 13 Feb 2018 07:38 )             26.4     02-13    137  |  94<L>  |  52<H>  ----------------------------<  170<H>  4.9   |  27  |  4.2<HH>    Ca    8.4<L>      13 Feb 2018 07:38  Mg     2.7     02-13    TPro  5.5<L>  /  Alb  2.1<L>  /  TBili  0.7  /  DBili  x   /  AST  19  /  ALT  15  /  AlkPhos  126<H>  02-13          LIVER FUNCTIONS - ( 13 Feb 2018 07:38 )  Alb: 2.1 g/dL / Pro: 5.5 g/dL / ALK PHOS: 126 U/L / ALT: 15 U/L / AST: 19 U/L / GGT: x           CAPILLARY BLOOD GLUCOSE            RADIOLOGY & ADDITIONAL TESTS:    Imaging Personally Reviewed:  [x ] YES  [ ] NO    MEDICATIONS  (STANDING):  ALBUTerol/ipratropium for Nebulization 3 milliLiter(s) Nebulizer every 6 hours  amiodarone    Tablet 100 milliGRAM(s) Oral two times a day  aspirin enteric coated 81 milliGRAM(s) Oral daily  atorvastatin 40 milliGRAM(s) Oral at bedtime  BACItracin   Ointment 1 Application(s) Topical two times a day  cefepime  IVPB 500 milliGRAM(s) IV Intermittent every 24 hours  cefepime  IVPB      clopidogrel Tablet 75 milliGRAM(s) Oral daily  docusate sodium 100 milliGRAM(s) Oral two times a day  heparin  Injectable 5000 Unit(s) SubCutaneous every 8 hours  methylPREDNISolone sodium succinate Injectable 40 milliGRAM(s) IV Push every 8 hours  pantoprazole   Suspension 40 milliGRAM(s) Oral before breakfast  senna 2 Tablet(s) Oral at bedtime  sevelamer hydrochloride 800 milliGRAM(s) Oral three times a day with meals  vancomycin  IVPB 500 milliGRAM(s) IV Intermittent every 48 hours  vitamin A &amp; D Ointment 1 Application(s) Topical two times a day    MEDICATIONS  (PRN):  acetaminophen  Suppository 650 milliGRAM(s) Rectal every 6 hours PRN For Temp greater than 38 C (100.4 F)  oxyCODONE    IR 5 milliGRAM(s) Oral three times a day PRN Moderate Pain (4 - 6)      HEALTH ISSUES - PROBLEM Dx:  Swelling of extremity of unknown etiology: Swelling of extremity of unknown etiology  Type 2 diabetes mellitus with chronic kidney disease on chronic dialysis, without long-term current use of insulin: Type 2 diabetes mellitus with chronic kidney disease on chronic dialysis, without long-term current use of insulin  Chronic atrial fibrillation: Chronic atrial fibrillation  Coronary artery disease involving coronary bypass graft without angina pectoris, unspecified whether native or transplanted heart: Coronary artery disease involving coronary bypass graft without angina pectoris, unspecified whether native or transplanted heart  ESRD (end stage renal disease): ESRD (end stage renal disease)  Pneumonia: Pneumonia

## 2018-02-14 NOTE — PROVIDER CONTACT NOTE (OTHER) - SITUATION
Pt complained of SOB. No labored breathing noted. Pulse ox 98% on 3 l n/c. MD Brady was at bedside to assess the patient. As per MD Brady, nephrologist, no further intervention except dialysis.

## 2018-02-15 LAB
ANION GAP SERPL CALC-SCNC: 13 MMOL/L — SIGNIFICANT CHANGE UP (ref 7–14)
BUN SERPL-MCNC: 52 MG/DL — HIGH (ref 10–20)
CALCIUM SERPL-MCNC: 8.5 MG/DL — SIGNIFICANT CHANGE UP (ref 8.5–10.1)
CHLORIDE SERPL-SCNC: 95 MMOL/L — LOW (ref 98–110)
CO2 SERPL-SCNC: 27 MMOL/L — SIGNIFICANT CHANGE UP (ref 17–32)
CREAT SERPL-MCNC: 3.8 MG/DL — HIGH (ref 0.7–1.5)
GLUCOSE SERPL-MCNC: 247 MG/DL — HIGH (ref 70–110)
HCT VFR BLD CALC: 30.4 % — LOW (ref 42–52)
HGB BLD-MCNC: 8.6 G/DL — LOW (ref 14–18)
MCHC RBC-ENTMCNC: 26 PG — LOW (ref 27–31)
MCHC RBC-ENTMCNC: 28.3 G/DL — LOW (ref 32–37)
MCV RBC AUTO: 91.8 FL — SIGNIFICANT CHANGE UP (ref 80–94)
NRBC # BLD: 0 /100 WBCS — SIGNIFICANT CHANGE UP (ref 0–0)
PLATELET # BLD AUTO: 166 K/UL — SIGNIFICANT CHANGE UP (ref 130–400)
POTASSIUM SERPL-MCNC: 4.9 MMOL/L — SIGNIFICANT CHANGE UP (ref 3.5–5)
POTASSIUM SERPL-SCNC: 4.9 MMOL/L — SIGNIFICANT CHANGE UP (ref 3.5–5)
RBC # BLD: 3.31 M/UL — LOW (ref 4.7–6.1)
RBC # FLD: 19.7 % — HIGH (ref 11.5–14.5)
SODIUM SERPL-SCNC: 135 MMOL/L — SIGNIFICANT CHANGE UP (ref 135–146)
WBC # BLD: 17.05 K/UL — HIGH (ref 4.8–10.8)
WBC # FLD AUTO: 17.05 K/UL — HIGH (ref 4.8–10.8)

## 2018-02-15 RX ORDER — INSULIN LISPRO 100/ML
4 VIAL (ML) SUBCUTANEOUS ONCE
Qty: 0 | Refills: 0 | Status: COMPLETED | OUTPATIENT
Start: 2018-02-15 | End: 2018-02-15

## 2018-02-15 RX ADMIN — Medication 100 MILLIGRAM(S): at 06:03

## 2018-02-15 RX ADMIN — Medication 100 MILLIGRAM(S): at 17:24

## 2018-02-15 RX ADMIN — Medication 1 APPLICATION(S): at 17:23

## 2018-02-15 RX ADMIN — Medication 3 MILLILITER(S): at 14:51

## 2018-02-15 RX ADMIN — Medication 3 MILLILITER(S): at 10:25

## 2018-02-15 RX ADMIN — SENNA PLUS 2 TABLET(S): 8.6 TABLET ORAL at 21:51

## 2018-02-15 RX ADMIN — PANTOPRAZOLE SODIUM 40 MILLIGRAM(S): 20 TABLET, DELAYED RELEASE ORAL at 06:03

## 2018-02-15 RX ADMIN — Medication 1 APPLICATION(S): at 06:05

## 2018-02-15 RX ADMIN — Medication 1 APPLICATION(S): at 06:19

## 2018-02-15 RX ADMIN — Medication 40 MILLIGRAM(S): at 06:06

## 2018-02-15 RX ADMIN — SEVELAMER CARBONATE 800 MILLIGRAM(S): 2400 POWDER, FOR SUSPENSION ORAL at 17:24

## 2018-02-15 RX ADMIN — HEPARIN SODIUM 5000 UNIT(S): 5000 INJECTION INTRAVENOUS; SUBCUTANEOUS at 21:52

## 2018-02-15 RX ADMIN — CEFEPIME 100 MILLIGRAM(S): 1 INJECTION, POWDER, FOR SOLUTION INTRAMUSCULAR; INTRAVENOUS at 21:51

## 2018-02-15 RX ADMIN — AMIODARONE HYDROCHLORIDE 100 MILLIGRAM(S): 400 TABLET ORAL at 06:18

## 2018-02-15 RX ADMIN — Medication 40 MILLIGRAM(S): at 13:49

## 2018-02-15 RX ADMIN — Medication 3 MILLILITER(S): at 01:27

## 2018-02-15 RX ADMIN — Medication 81 MILLIGRAM(S): at 11:20

## 2018-02-15 RX ADMIN — AMIODARONE HYDROCHLORIDE 100 MILLIGRAM(S): 400 TABLET ORAL at 17:24

## 2018-02-15 RX ADMIN — SEVELAMER CARBONATE 800 MILLIGRAM(S): 2400 POWDER, FOR SUSPENSION ORAL at 08:21

## 2018-02-15 RX ADMIN — ATORVASTATIN CALCIUM 40 MILLIGRAM(S): 80 TABLET, FILM COATED ORAL at 21:52

## 2018-02-15 RX ADMIN — HEPARIN SODIUM 5000 UNIT(S): 5000 INJECTION INTRAVENOUS; SUBCUTANEOUS at 13:48

## 2018-02-15 RX ADMIN — HEPARIN SODIUM 5000 UNIT(S): 5000 INJECTION INTRAVENOUS; SUBCUTANEOUS at 06:06

## 2018-02-15 RX ADMIN — CLOPIDOGREL BISULFATE 75 MILLIGRAM(S): 75 TABLET, FILM COATED ORAL at 11:19

## 2018-02-15 RX ADMIN — SEVELAMER CARBONATE 800 MILLIGRAM(S): 2400 POWDER, FOR SUSPENSION ORAL at 11:20

## 2018-02-15 NOTE — DIETITIAN INITIAL EVALUATION ADULT. - DIET TYPE
with Nepro shake q8hr/consistent carbohydrate (no snacks)/renal replacement pts:no protein restr,no conc K & phos, low sodium

## 2018-02-15 NOTE — CONSULT NOTE ADULT - SUBJECTIVE AND OBJECTIVE BOX
HPI:  This is an 87 YO M, PMH ESRD on HD, CAD s/p CABG 2016 and PCI 2017, AFib, recurrent pleural effusions, DM, presents from [facility] with 2 days of lethargy, subjective fevers, low BP, cough, and hypoxia 78% on 3-4 liters NC. He is a poor historian, cantonese speaking, family at bedside to interpret, but patient is currently on BiPAP and difficulty answering questions. Denies pain or discomfort at this time. Per daughter, patient was on Tamiflu last month as prophylaxis due to outbreak at NH.    MEDICATIONS GIVEN IN ED:  Cefepime, Azithromycin    Pt:  no compalints.  No acute events o/n  Vital Signs Last 24 Hrs  T(C): 35.9 (15 Feb 2018 22:58), Max: 36.6 (15 Feb 2018 14:30)  T(F): 96.6 (15 Feb 2018 22:58), Max: 97.9 (15 Feb 2018 14:30)  HR: 75 (15 Feb 2018 22:58) (75 - 89)  BP: 124/68 (15 Feb 2018 22:58) (105/50 - 139/63)  BP(mean): --  RR: 18 (15 Feb 2018 22:58) (18 - 18)  SpO2: 99% (15 Feb 2018 17:31) (95% - 99%)        I&O's Summary    14 Feb 2018 07:01  -  15 Feb 2018 07:00  --------------------------------------------------------  IN: 1000 mL / OUT: 0 mL / NET: 1000 mL        02-15    135  |  95<L>  |  52<H>  ----------------------------<  247<H>  4.9   |  27  |  3.8<H>    Ca    8.5      15 Feb 2018 07:43                            8.6    17.05 )-----------( 166      ( 15 Feb 2018 07:43 )             30.4     CAPILLARY BLOOD GLUCOSE  251 (15 Feb 2018 16:37)  85 (15 Feb 2018 02:00)      WOUNDS:   Ecchymosis bilateral UE   Skin avulsion/ tears bilateral forearms with dried clot and adherent [place] dressing, no active bleeding  Largely healed pink PT wound right leg  Sacrum - small ~ 1cm pink wounds and sl macerated skin

## 2018-02-15 NOTE — DIETITIAN INITIAL EVALUATION ADULT. - PT NOT SOURCE
Pt appears alert and oriented. reported fair appetite about 50% usually even back at NH. However, consuming some of the Nepro supplements. reported poor dentition but able to chew. swallowing sometimes problematic due to COPD. LBM 2 days ago./other (specify)

## 2018-02-15 NOTE — DIETITIAN INITIAL EVALUATION ADULT. - PROBLEM SELECTOR PLAN 5
- Follow up FS  - Diet controlled, start insulin if FS elevated  - PPx: Heparin, Protonix  - DNR, not DNI  - From NH, discharge when stable back to [facility]  - Contact Daughter Faviola Nunez at [phone]

## 2018-02-15 NOTE — DIETITIAN INITIAL EVALUATION ADULT. - PERTINENT MEDS FT
aspirin, clopidogrel, heparin, abx, oxy, methylprednisolone, oxy, acetaminophen, docusate, senna, protonix,

## 2018-02-15 NOTE — DIETITIAN INITIAL EVALUATION ADULT. - SOURCE
other (specify)/patient/This is an 85 YO M, PMH ESRD on HD, CAD s/p CABG 2016 and PCI 2017, AFib, recurrent pleural effusions, DM, presents from [facility] with 2 days of lethargy, subjective fevers, low BP, cough, and hypoxia 78% on 3-4 liters NC. He is a poor historian, cantonese speaking, family at bedside to interpret, but patient is currently on BiPAP and difficulty answering questions. Denies pain or discomfort at this time. Per daughter, patient was on Tamiflu last month as prophylaxis due to outbreak at NH.  C/w HD MWF, PNA on abx. DNR but not DNI. Monitoring and on meds for CVA and A fib. Monitoring DM2

## 2018-02-15 NOTE — PROGRESS NOTE ADULT - SUBJECTIVE AND OBJECTIVE BOX
KEE GARCIA  86y  Male    Patient is a 86y old  Male who presents with a chief complaint of Cough, Fevers, Lethargy x 2 days (11 Feb 2018 22:07)      INTERVAL HPI/OVERNIGHT EVENTS:    T(C): 36.4 (02-14-18 @ 22:51), Max: 36.4 (02-14-18 @ 22:51)  HR: 75 (02-14-18 @ 22:51) (74 - 77)  BP: 131/63 (02-14-18 @ 22:51) (100/43 - 131/63)  RR: 18 (02-14-18 @ 22:51) (17 - 18)  SpO2: 100% (02-14-18 @ 15:18) (100% - 100%)  Wt(kg): --Vital Signs Last 24 Hrs  T(C): 36.4 (14 Feb 2018 22:51), Max: 36.4 (14 Feb 2018 22:51)  T(F): 97.6 (14 Feb 2018 22:51), Max: 97.6 (14 Feb 2018 22:51)  HR: 75 (14 Feb 2018 22:51) (74 - 77)  BP: 131/63 (14 Feb 2018 22:51) (100/43 - 131/63)  BP(mean): --  RR: 18 (14 Feb 2018 22:51) (17 - 18)  SpO2: 100% (14 Feb 2018 15:18) (100% - 100%)    PHYSICAL EXAM:  GENERAL: NAD, well-groomed, well-developed  HEAD:  Atraumatic, Normocephalic  NECK: Supple, No JVD, Normal thyroid  NERVOUS SYSTEM:  Alert & Oriented X3, Good concentration; Motor Strength 5/5 B/L upper and lower extremities; DTRs 2+ intact and symmetric  CHEST/LUNG: Clear to percussion bilaterally; No rales, rhonchi, wheezing, or rubs  HEART: Regular rate and rhythm; No murmurs, rubs, or gallops  ABDOMEN: Soft, Nontender, Nondistended; Bowel sounds present  EXTREMITIES:  2+ Peripheral Pulses, No clubbing, cyanosis, or edema    Consultant(s) Notes Reviewed:  [x ] YES  [ ] NO    Discussed with Consultants/Other Providers/Residents [ x] YES     LABS                         8.0    13.92 )-----------( 173      ( 14 Feb 2018 08:11 )             27.2     02-14    135  |  96<L>  |  76<HH>  ----------------------------<  167<H>  5.6<H>   |  28  |  5.6<HH>    Ca    8.6      14 Feb 2018 08:11  Mg     2.7     02-13    TPro  5.5<L>  /  Alb  2.1<L>  /  TBili  0.7  /  DBili  x   /  AST  19  /  ALT  15  /  AlkPhos  126<H>  02-13          LIVER FUNCTIONS - ( 13 Feb 2018 07:38 )  Alb: 2.1 g/dL / Pro: 5.5 g/dL / ALK PHOS: 126 U/L / ALT: 15 U/L / AST: 19 U/L / GGT: x           CAPILLARY BLOOD GLUCOSE            RADIOLOGY & ADDITIONAL TESTS:    Imaging Personally Reviewed:  [x ] YES  [ ] NO    MEDICATIONS  (STANDING):  ALBUTerol/ipratropium for Nebulization 3 milliLiter(s) Nebulizer every 6 hours  amiodarone    Tablet 100 milliGRAM(s) Oral two times a day  aspirin enteric coated 81 milliGRAM(s) Oral daily  atorvastatin 40 milliGRAM(s) Oral at bedtime  BACItracin   Ointment 1 Application(s) Topical two times a day  cefepime  IVPB 500 milliGRAM(s) IV Intermittent every 24 hours  cefepime  IVPB      clopidogrel Tablet 75 milliGRAM(s) Oral daily  docusate sodium 100 milliGRAM(s) Oral two times a day  heparin  Injectable 5000 Unit(s) SubCutaneous every 8 hours  methylPREDNISolone sodium succinate Injectable 40 milliGRAM(s) IV Push every 8 hours  pantoprazole   Suspension 40 milliGRAM(s) Oral before breakfast  senna 2 Tablet(s) Oral at bedtime  sevelamer hydrochloride 800 milliGRAM(s) Oral three times a day with meals  vitamin A &amp; D Ointment 1 Application(s) Topical two times a day    MEDICATIONS  (PRN):  acetaminophen  Suppository 650 milliGRAM(s) Rectal every 6 hours PRN For Temp greater than 38 C (100.4 F)  oxyCODONE    IR 5 milliGRAM(s) Oral three times a day PRN Moderate Pain (4 - 6)  vancomycin  IVPB 500 milliGRAM(s) IV Intermittent <User Schedule> PRN dialysis      HEALTH ISSUES - PROBLEM Dx:  Swelling of extremity of unknown etiology: Swelling of extremity of unknown etiology  Type 2 diabetes mellitus with chronic kidney disease on chronic dialysis, without long-term current use of insulin: Type 2 diabetes mellitus with chronic kidney disease on chronic dialysis, without long-term current use of insulin  Chronic atrial fibrillation: Chronic atrial fibrillation  Coronary artery disease involving coronary bypass graft without angina pectoris, unspecified whether native or transplanted heart: Coronary artery disease involving coronary bypass graft without angina pectoris, unspecified whether native or transplanted heart  ESRD (end stage renal disease): ESRD (end stage renal disease)  Pneumonia: Pneumonia

## 2018-02-15 NOTE — DIETITIAN INITIAL EVALUATION ADULT. - OTHER INFO
Per patient, likely some weight loss in the recent months but unable to have a timeline of the weight loss.

## 2018-02-15 NOTE — PROGRESS NOTE ADULT - ATTENDING COMMENTS
1. Cough fever lethargy hypoxia Sepsis hypotension 2/2 Pneumonia       Opacities on CXR, elevated WBC      c/w Vancomycin, Cefepime started at NH      FU Blood, Urine, Sputum cultures, MRSA swabs; r/o  Influenza (on contact precautions)     Tylenol for fever     c/w w/ BIPAP (Pt declining), O2, Nebulizers, Solumedrol       Pt is DNR but not DNI, intubate if resp decompensation     Hold BP meds      2.  ESRD       FU labs      HD  M/W/F      Renal f/u appreciated      c/w Renvela     3.  CAD       c/w ASA, Plavix, Lipitor      4.   Chronic atrial fibrillation/ HTN       Not on AC due to fall risk       c/w Amiodarone, need to hold nifedipine, enalapril?    5.  DM2       Follow up FS       Diet controlled, start insulin if FS elevated    6. PPx: Heparin, Protonix.

## 2018-02-15 NOTE — CONSULT NOTE ADULT - ASSESSMENT
ASSESSMENT/ REC :   PT /FT skin tears both forearms- wash daily and apply Bacitracin ointment and Xeroform with dry dressing  Sacral presure ulcer - clean - offloading , Allevyn with BAcitracin ointment to site.  No evidence of infection arms or sacrum

## 2018-02-16 ENCOUNTER — TRANSCRIPTION ENCOUNTER (OUTPATIENT)
Age: 83
End: 2018-02-16

## 2018-02-16 VITALS
RESPIRATION RATE: 16 BRPM | TEMPERATURE: 98 F | SYSTOLIC BLOOD PRESSURE: 136 MMHG | DIASTOLIC BLOOD PRESSURE: 62 MMHG | HEART RATE: 86 BPM

## 2018-02-16 LAB
ANION GAP SERPL CALC-SCNC: 13 MMOL/L — SIGNIFICANT CHANGE UP (ref 7–14)
BUN SERPL-MCNC: 77 MG/DL — CRITICAL HIGH (ref 10–20)
CALCIUM SERPL-MCNC: 8.5 MG/DL — SIGNIFICANT CHANGE UP (ref 8.5–10.1)
CHLORIDE SERPL-SCNC: 98 MMOL/L — SIGNIFICANT CHANGE UP (ref 98–110)
CO2 SERPL-SCNC: 27 MMOL/L — SIGNIFICANT CHANGE UP (ref 17–32)
CREAT SERPL-MCNC: 5 MG/DL — CRITICAL HIGH (ref 0.7–1.5)
GLUCOSE SERPL-MCNC: 154 MG/DL — HIGH (ref 70–110)
HCT VFR BLD CALC: 27.9 % — LOW (ref 42–52)
HGB BLD-MCNC: 7.9 G/DL — LOW (ref 14–18)
MCHC RBC-ENTMCNC: 25.9 PG — LOW (ref 27–31)
MCHC RBC-ENTMCNC: 28.3 G/DL — LOW (ref 32–37)
MCV RBC AUTO: 91.5 FL — SIGNIFICANT CHANGE UP (ref 80–94)
NRBC # BLD: 0 /100 WBCS — SIGNIFICANT CHANGE UP (ref 0–0)
PLATELET # BLD AUTO: 163 K/UL — SIGNIFICANT CHANGE UP (ref 130–400)
POTASSIUM SERPL-MCNC: 4.8 MMOL/L — SIGNIFICANT CHANGE UP (ref 3.5–5)
POTASSIUM SERPL-SCNC: 4.8 MMOL/L — SIGNIFICANT CHANGE UP (ref 3.5–5)
RBC # BLD: 3.05 M/UL — LOW (ref 4.7–6.1)
RBC # FLD: 19.8 % — HIGH (ref 11.5–14.5)
SODIUM SERPL-SCNC: 138 MMOL/L — SIGNIFICANT CHANGE UP (ref 135–146)
WBC # BLD: 15.11 K/UL — HIGH (ref 4.8–10.8)
WBC # FLD AUTO: 15.11 K/UL — HIGH (ref 4.8–10.8)

## 2018-02-16 RX ORDER — BACITRACIN ZINC 500 UNIT/G
1 OINTMENT IN PACKET (EA) TOPICAL
Qty: 0 | Refills: 0 | COMMUNITY
Start: 2018-02-16

## 2018-02-16 RX ORDER — IPRATROPIUM/ALBUTEROL SULFATE 18-103MCG
3 AEROSOL WITH ADAPTER (GRAM) INHALATION
Qty: 0 | Refills: 0 | COMMUNITY
Start: 2018-02-16

## 2018-02-16 RX ORDER — CEFEPIME 1 G/1
500 INJECTION, POWDER, FOR SOLUTION INTRAMUSCULAR; INTRAVENOUS
Qty: 0 | Refills: 0 | COMMUNITY
Start: 2018-02-16

## 2018-02-16 RX ORDER — VANCOMYCIN HCL 1 G
750 VIAL (EA) INTRAVENOUS
Qty: 0 | Refills: 0 | COMMUNITY
Start: 2018-02-16

## 2018-02-16 RX ORDER — VANCOMYCIN HCL 1 G
750 VIAL (EA) INTRAVENOUS
Qty: 0 | Refills: 0 | Status: DISCONTINUED | OUTPATIENT
Start: 2018-02-16 | End: 2018-02-16

## 2018-02-16 RX ORDER — INSULIN LISPRO 100/ML
10 VIAL (ML) SUBCUTANEOUS ONCE
Qty: 0 | Refills: 0 | Status: COMPLETED | OUTPATIENT
Start: 2018-02-16 | End: 2018-02-16

## 2018-02-16 RX ORDER — CEFEPIME 1 G/1
500 INJECTION, POWDER, FOR SOLUTION INTRAMUSCULAR; INTRAVENOUS
Qty: 0 | Refills: 0 | Status: DISCONTINUED | OUTPATIENT
Start: 2018-02-16 | End: 2018-02-16

## 2018-02-16 RX ADMIN — Medication 100 MILLIGRAM(S): at 17:30

## 2018-02-16 RX ADMIN — PANTOPRAZOLE SODIUM 40 MILLIGRAM(S): 20 TABLET, DELAYED RELEASE ORAL at 06:14

## 2018-02-16 RX ADMIN — Medication 100 MILLIGRAM(S): at 10:04

## 2018-02-16 RX ADMIN — ATORVASTATIN CALCIUM 40 MILLIGRAM(S): 80 TABLET, FILM COATED ORAL at 21:22

## 2018-02-16 RX ADMIN — Medication 4 UNIT(S): at 00:11

## 2018-02-16 RX ADMIN — SEVELAMER CARBONATE 800 MILLIGRAM(S): 2400 POWDER, FOR SUSPENSION ORAL at 17:30

## 2018-02-16 RX ADMIN — Medication 1 APPLICATION(S): at 16:11

## 2018-02-16 RX ADMIN — SEVELAMER CARBONATE 800 MILLIGRAM(S): 2400 POWDER, FOR SUSPENSION ORAL at 12:04

## 2018-02-16 RX ADMIN — Medication 100 MILLIGRAM(S): at 06:11

## 2018-02-16 RX ADMIN — Medication 3 MILLILITER(S): at 13:45

## 2018-02-16 RX ADMIN — OXYCODONE HYDROCHLORIDE 5 MILLIGRAM(S): 5 TABLET ORAL at 20:02

## 2018-02-16 RX ADMIN — SEVELAMER CARBONATE 800 MILLIGRAM(S): 2400 POWDER, FOR SUSPENSION ORAL at 08:11

## 2018-02-16 RX ADMIN — CLOPIDOGREL BISULFATE 75 MILLIGRAM(S): 75 TABLET, FILM COATED ORAL at 12:04

## 2018-02-16 RX ADMIN — SENNA PLUS 2 TABLET(S): 8.6 TABLET ORAL at 21:21

## 2018-02-16 RX ADMIN — Medication 50 MILLIGRAM(S): at 06:15

## 2018-02-16 RX ADMIN — AMIODARONE HYDROCHLORIDE 100 MILLIGRAM(S): 400 TABLET ORAL at 06:11

## 2018-02-16 RX ADMIN — HEPARIN SODIUM 5000 UNIT(S): 5000 INJECTION INTRAVENOUS; SUBCUTANEOUS at 21:21

## 2018-02-16 RX ADMIN — HEPARIN SODIUM 5000 UNIT(S): 5000 INJECTION INTRAVENOUS; SUBCUTANEOUS at 13:34

## 2018-02-16 RX ADMIN — OXYCODONE HYDROCHLORIDE 5 MILLIGRAM(S): 5 TABLET ORAL at 19:32

## 2018-02-16 RX ADMIN — Medication 10 UNIT(S): at 18:11

## 2018-02-16 RX ADMIN — Medication 3 MILLILITER(S): at 19:46

## 2018-02-16 RX ADMIN — AMIODARONE HYDROCHLORIDE 100 MILLIGRAM(S): 400 TABLET ORAL at 17:30

## 2018-02-16 RX ADMIN — Medication 1 APPLICATION(S): at 06:13

## 2018-02-16 RX ADMIN — HEPARIN SODIUM 5000 UNIT(S): 5000 INJECTION INTRAVENOUS; SUBCUTANEOUS at 06:15

## 2018-02-16 RX ADMIN — Medication 3 MILLILITER(S): at 07:51

## 2018-02-16 RX ADMIN — Medication 81 MILLIGRAM(S): at 13:33

## 2018-02-16 NOTE — PROGRESS NOTE ADULT - SUBJECTIVE AND OBJECTIVE BOX
Nephrology progress note    Patient is seen and examined, no acute events over the last 24 h noted .  feels better and breath improves. no fever/chills, CP, abd pain, rash.    Allergies:  No Known Allergies    Hospital Medications:   MEDICATIONS  (STANDING):  ALBUTerol/ipratropium for Nebulization 3 milliLiter(s) Nebulizer every 6 hours  amiodarone    Tablet 100 milliGRAM(s) Oral two times a day  aspirin enteric coated 81 milliGRAM(s) Oral daily  atorvastatin 40 milliGRAM(s) Oral at bedtime  BACItracin   Ointment 1 Application(s) Topical two times a day  cefepime  IVPB 500 milliGRAM(s) IV Intermittent every 24 hours  cefepime  IVPB      clopidogrel Tablet 75 milliGRAM(s) Oral daily  docusate sodium 100 milliGRAM(s) Oral two times a day  heparin  Injectable 5000 Unit(s) SubCutaneous every 8 hours  pantoprazole   Suspension 40 milliGRAM(s) Oral before breakfast  predniSONE   Tablet 50 milliGRAM(s) Oral daily  predniSONE   Tablet   Oral   senna 2 Tablet(s) Oral at bedtime  sevelamer hydrochloride 800 milliGRAM(s) Oral three times a day with meals  vitamin A &amp; D Ointment 1 Application(s) Topical two times a day    VITALS:  T(F): 96.2 (02-16-18 @ 05:20), Max: 97.9 (02-15-18 @ 14:30)  HR: 75 (02-16-18 @ 08:30)  BP: 127/98 (02-16-18 @ 08:30)  RR: 20 (02-16-18 @ 08:30)  SpO2: 97% (02-15-18 @ 21:00)    02-14 @ 07:01  -  02-15 @ 07:00  --------------------------------------------------------  IN: 1000 mL / OUT: 0 mL / NET: 1000 mL          PHYSICAL EXAM:  Constitutional: NAD  Neck: No JVD  Respiratory: (+) rhonch  Cardiovascular: S1, S2, RRR  Gastrointestinal: BS+, soft, NT/ND  Extremities: mild leg edema  Vascular Access: left upper arm fistula    LABS:  02-16    138  |  98  |  77<HH>  ----------------------------<  154<H>  4.8   |  27  |  5.0<HH>    Ca    8.5      16 Feb 2018 06:14                            7.9    15.11 )-----------( 163      ( 16 Feb 2018 06:14 )             27.9     Creatinine Trend: 5.0<--, 3.8<--, 5.6<--, 4.2<--, 5.5<--    Iron with Total Binding Capacity in AM (02.14.18 @ 08:11)    % Saturation, Iron: 21 %    Iron - Total Binding Capacity.: 199 ug/mL    Iron Total, Serum: 42 ug/dL    Unsaturated Iron Binding Capacity: TNP ug/dL      Urine Studies:      RADIOLOGY & ADDITIONAL STUDIES:  < from: Xray Chest 2 Views PA/Lat (02.14.18 @ 21:33) >  Impression:      Stable vascular congestion and right greater than left effusions.    LAWRENCE SINCLAIR M.D., ATTENDING RADIOLOGIST  This document has been electronically signed. Feb 15 2018  9:23AM    < end of copied text > Nephrology progress note    Patient is seen and examined, no acute events over the last 24 h noted .  feels better and breath improves. no fever/chills, CP, abd pain, rash.    Allergies:  No Known Allergies    Hospital Medications:   MEDICATIONS  (STANDING):  ALBUTerol/ipratropium for Nebulization 3 milliLiter(s) Nebulizer every 6 hours  amiodarone    Tablet 100 milliGRAM(s) Oral two times a day  aspirin enteric coated 81 milliGRAM(s) Oral daily  atorvastatin 40 milliGRAM(s) Oral at bedtime  BACItracin   Ointment 1 Application(s) Topical two times a day  cefepime  IVPB 500 milliGRAM(s) IV Intermittent every 24 hours  cefepime  IVPB      clopidogrel Tablet 75 milliGRAM(s) Oral daily  docusate sodium 100 milliGRAM(s) Oral two times a day  heparin  Injectable 5000 Unit(s) SubCutaneous every 8 hours  pantoprazole   Suspension 40 milliGRAM(s) Oral before breakfast  predniSONE   Tablet 50 milliGRAM(s) Oral daily  predniSONE   Tablet   Oral   senna 2 Tablet(s) Oral at bedtime  sevelamer hydrochloride 800 milliGRAM(s) Oral three times a day with meals  vitamin A &amp; D Ointment 1 Application(s) Topical two times a day    VITALS:  T(F): 96.2 (02-16-18 @ 05:20), Max: 97.9 (02-15-18 @ 14:30)  HR: 75 (02-16-18 @ 08:30)  BP: 127/98 (02-16-18 @ 08:30)  RR: 20 (02-16-18 @ 08:30)  SpO2: 97% (02-15-18 @ 21:00)    02-14 @ 07:01  -  02-15 @ 07:00  --------------------------------------------------------  IN: 1000 mL / OUT: 0 mL / NET: 1000 mL    PHYSICAL EXAM:  Constitutional: NAD  Neck: No JVD  Respiratory: (+) rhonch  Cardiovascular: S1, S2, RRR  Gastrointestinal: BS+, soft, NT/ND  Extremities: mild leg edema  Vascular Access: left upper arm fistula    LABS:  02-16    138  |  98  |  77<HH>  ----------------------------<  154<H>  4.8   |  27  |  5.0<HH>    Ca    8.5      16 Feb 2018 06:14                            7.9    15.11 )-----------( 163      ( 16 Feb 2018 06:14 )             27.9     Creatinine Trend: 5.0<--, 3.8<--, 5.6<--, 4.2<--, 5.5<--    Iron with Total Binding Capacity in AM (02.14.18 @ 08:11)    % Saturation, Iron: 21 %    Iron - Total Binding Capacity.: 199 ug/mL    Iron Total, Serum: 42 ug/dL    Unsaturated Iron Binding Capacity: TNP ug/dL      Urine Studies:      RADIOLOGY & ADDITIONAL STUDIES:  < from: Xray Chest 2 Views PA/Lat (02.14.18 @ 21:33) >  Impression:      Stable vascular congestion and right greater than left effusions.    LAWRENCE SINCLAIR M.D., ATTENDING RADIOLOGIST  This document has been electronically signed. Feb 15 2018  9:23AM    < end of copied text > Nephrology progress note    Patient is seen and examined, no acute events over the last 24 h noted .  feels better and SOB has improved     Allergies:  No Known Allergies    Hospital Medications:   MEDICATIONS  (STANDING):  ALBUTerol/ipratropium for Nebulization 3 milliLiter(s) Nebulizer every 6 hours  amiodarone    Tablet 100 milliGRAM(s) Oral two times a day  aspirin enteric coated 81 milliGRAM(s) Oral daily  atorvastatin 40 milliGRAM(s) Oral at bedtime  BACItracin   Ointment 1 Application(s) Topical two times a day  cefepime  IVPB 500 milliGRAM(s) IV Intermittent every 24 hours    clopidogrel Tablet 75 milliGRAM(s) Oral daily  docusate sodium 100 milliGRAM(s) Oral two times a day  heparin  Injectable 5000 Unit(s) SubCutaneous every 8 hours  pantoprazole   Suspension 40 milliGRAM(s) Oral before breakfast  predniSONE   Tablet 50 milliGRAM(s) Oral daily  senna 2 Tablet(s) Oral at bedtime  sevelamer hydrochloride 800 milliGRAM(s) Oral three times a day with meals  vitamin A &amp; D Ointment 1 Application(s) Topical two times a day    VITALS:  T(F): 96.2 (02-16-18 @ 05:20), Max: 97.9 (02-15-18 @ 14:30)  HR: 75 (02-16-18 @ 08:30)  BP: 127/98 (02-16-18 @ 08:30)  RR: 20 (02-16-18 @ 08:30)  SpO2: 97% (02-15-18 @ 21:00)    02-14 @ 07:01  -  02-15 @ 07:00  --------------------------------------------------------  IN: 1000 mL / OUT: 0 mL / NET: 1000 mL    PHYSICAL EXAM:  Constitutional: NAD  Neck: No JVD  Respiratory: (+) rhonch  Cardiovascular: S1, S2, RRR  Gastrointestinal: BS+, soft, NT/ND  Extremities: mild leg edema  Vascular Access: left upper arm fistula    LABS:  02-16    138  |  98  |  77<HH>  ----------------------------<  154<H>  4.8   |  27  |  5.0<HH>    Ca    8.5      16 Feb 2018 06:14                            7.9    15.11 )-----------( 163      ( 16 Feb 2018 06:14 )             27.9     Creatinine Trend: 5.0<--, 3.8<--, 5.6<--, 4.2<--, 5.5<--    Iron with Total Binding Capacity in AM (02.14.18 @ 08:11)    % Saturation, Iron: 21 %    Iron - Total Binding Capacity.: 199 ug/mL    Iron Total, Serum: 42 ug/dL    Unsaturated Iron Binding Capacity: TNP ug/dL      Urine Studies:      RADIOLOGY & ADDITIONAL STUDIES:  < from: Xray Chest 2 Views PA/Lat (02.14.18 @ 21:33) >  Impression:      Stable vascular congestion and right greater than left effusions.    LAWRENCE SINCLAIR M.D., ATTENDING RADIOLOGIST  This document has been electronically signed. Feb 15 2018  9:23AM    < end of copied text >

## 2018-02-16 NOTE — PROGRESS NOTE ADULT - SUBJECTIVE AND OBJECTIVE BOX
KEE GARCIA  86y  Male    Patient is a 86y old  Male who presents with a chief complaint of Cough, Fevers, Lethargy x 2 days (11 Feb 2018 22:07)      INTERVAL HPI/OVERNIGHT EVENTS:    T(C): 35.9 (02-15-18 @ 22:58), Max: 36.6 (02-15-18 @ 14:30)  HR: 75 (02-15-18 @ 22:58) (75 - 89)  BP: 124/68 (02-15-18 @ 22:58) (105/50 - 139/63)  RR: 18 (02-15-18 @ 22:58) (18 - 18)  SpO2: 99% (02-15-18 @ 17:31) (95% - 99%)  Wt(kg): --Vital Signs Last 24 Hrs  T(C): 35.9 (15 Feb 2018 22:58), Max: 36.6 (15 Feb 2018 14:30)  T(F): 96.6 (15 Feb 2018 22:58), Max: 97.9 (15 Feb 2018 14:30)  HR: 75 (15 Feb 2018 22:58) (75 - 89)  BP: 124/68 (15 Feb 2018 22:58) (105/50 - 139/63)  BP(mean): --  RR: 18 (15 Feb 2018 22:58) (18 - 18)  SpO2: 99% (15 Feb 2018 17:31) (95% - 99%)    PHYSICAL EXAM:  GENERAL: NAD, well-groomed, well-developed  HEAD:  Atraumatic, Normocephalic  NECK: Supple, No JVD, Normal thyroid  NERVOUS SYSTEM:  Alert & Oriented X3, Good concentration; Motor Strength 5/5 B/L upper and lower extremities; DTRs 2+ intact and symmetric  CHEST/LUNG: Clear to percussion bilaterally; No rales, rhonchi, wheezing, or rubs  HEART: Regular rate and rhythm; No murmurs, rubs, or gallops  ABDOMEN: Soft, Nontender, Nondistended; Bowel sounds present  EXTREMITIES:  2+ Peripheral Pulses, No clubbing, cyanosis, or edema    Consultant(s) Notes Reviewed:  [x ] YES  [ ] NO    Discussed with Consultants/Other Providers/Residents [ x] YES     LABS                         8.6    17.05 )-----------( 166      ( 15 Feb 2018 07:43 )             30.4     02-15    135  |  95<L>  |  52<H>  ----------------------------<  247<H>  4.9   |  27  |  3.8<H>    Ca    8.5      15 Feb 2018 07:43              CAPILLARY BLOOD GLUCOSE            RADIOLOGY & ADDITIONAL TESTS:    Imaging Personally Reviewed:  [x ] YES  [ ] NO    MEDICATIONS  (STANDING):  ALBUTerol/ipratropium for Nebulization 3 milliLiter(s) Nebulizer every 6 hours  amiodarone    Tablet 100 milliGRAM(s) Oral two times a day  aspirin enteric coated 81 milliGRAM(s) Oral daily  atorvastatin 40 milliGRAM(s) Oral at bedtime  BACItracin   Ointment 1 Application(s) Topical two times a day  cefepime  IVPB 500 milliGRAM(s) IV Intermittent every 24 hours  cefepime  IVPB      clopidogrel Tablet 75 milliGRAM(s) Oral daily  docusate sodium 100 milliGRAM(s) Oral two times a day  heparin  Injectable 5000 Unit(s) SubCutaneous every 8 hours  pantoprazole   Suspension 40 milliGRAM(s) Oral before breakfast  predniSONE   Tablet 50 milliGRAM(s) Oral daily  predniSONE   Tablet   Oral   senna 2 Tablet(s) Oral at bedtime  sevelamer hydrochloride 800 milliGRAM(s) Oral three times a day with meals  vitamin A &amp; D Ointment 1 Application(s) Topical two times a day    MEDICATIONS  (PRN):  acetaminophen  Suppository 650 milliGRAM(s) Rectal every 6 hours PRN For Temp greater than 38 C (100.4 F)  oxyCODONE    IR 5 milliGRAM(s) Oral three times a day PRN Moderate Pain (4 - 6)  vancomycin  IVPB 500 milliGRAM(s) IV Intermittent <User Schedule> PRN dialysis      HEALTH ISSUES - PROBLEM Dx:  Swelling of extremity of unknown etiology: Swelling of extremity of unknown etiology  Type 2 diabetes mellitus with chronic kidney disease on chronic dialysis, without long-term current use of insulin: Type 2 diabetes mellitus with chronic kidney disease on chronic dialysis, without long-term current use of insulin  Chronic atrial fibrillation: Chronic atrial fibrillation  Coronary artery disease involving coronary bypass graft without angina pectoris, unspecified whether native or transplanted heart: Coronary artery disease involving coronary bypass graft without angina pectoris, unspecified whether native or transplanted heart  ESRD (end stage renal disease): ESRD (end stage renal disease)  Pneumonia: Pneumonia

## 2018-02-16 NOTE — PROGRESS NOTE ADULT - ATTENDING COMMENTS
1. Cough fever lethargy hypoxia Sepsis hypotension 2/2 Pneumonia       Opacities on CXR, elevated WBC      c/w Vancomycin, Cefepime started at NH      FU Blood, Urine, Sputum cultures, MRSA swabs; r/o  Influenza (on contact precautions)     Tylenol for fever     c/w w/ BIPAP (Pt declining), O2, Nebulizers, Solumedrol       Pt is DNR but not DNI, intubate if resp decompensation     Hold BP meds      2.  ESRD       FU labs      HD  M/W/F      Renal f/u appreciated      c/w Renvela     3.  CAD       c/w ASA, Plavix, Lipitor      4.   Chronic atrial fibrillation/ HTN       Not on AC due to fall risk       c/w Amiodarone, need to hold nifedipine, enalapril?    5.  DM2       Follow up FS       Diet controlled, start insulin if FS elevated    6. PPx: Heparin, Protonix    DC planning

## 2018-02-16 NOTE — DISCHARGE NOTE ADULT - CARE PLAN
Principal Discharge DX:	Pneumonia  Goal:	Medical management  Assessment and plan of treatment:	Continue antibitoics and follow up as an outpatient

## 2018-02-16 NOTE — PROGRESS NOTE ADULT - ATTENDING COMMENTS
I have examined the patient independently and edited the A and plan along with physical exam   Please see above

## 2018-02-16 NOTE — DISCHARGE NOTE ADULT - PATIENT PORTAL LINK FT
You can access the [facility] Patient Portal, offered by [facility], by registering with the following website: [email or web address]

## 2018-02-16 NOTE — CONSULT NOTE ADULT - ASSESSMENT
Patient is a 85yo male with pmh significant for ESRD, HTN, CAD s/p cabg and pci in 2017, AFib not on AC and recurrent pleural effusions presenting from NH for fever, lethargy and hypoxia for 2 days.     Sepsis 2/2 to PNA  - leukocytosis slow to resolve   - RVP negative  - Blood Cx pending, UA pending, Sputum cx and Urine Cx pending   - Afebrile over 48 hours   - vanco trough 7.9   - would continue vancomycin 650 mg with dialysis and cefepime 500 q 24    NOT YET DISCUSSED WITH ATTENDING

## 2018-02-16 NOTE — DISCHARGE NOTE ADULT - MEDICATION SUMMARY - MEDICATIONS TO TAKE
I will START or STAY ON the medications listed below when I get home from the hospital:    predniSONE 5 mg oral tablet  -- 1 tab(s) by mouth once a day  -- Indication: For Asthma    Aspir 81 oral delayed release tablet  -- 1 tab(s) by mouth once a day  -- Indication: For CAD    enalapril 5 mg oral tablet  -- 1 tab(s) by mouth once a day  -- Indication: For Htn    amiodarone 100 mg oral tablet  -- 2 tab(s) by mouth once a day  -- Indication: For Afib    Lipitor 40 mg oral tablet  -- 1 tab(s) by mouth once a day  -- Indication: For Dys    Plavix 75 mg oral tablet  -- 1 tab(s) by mouth once a day  -- Indication: For CAD    albuterol 5 mg/mL (0.5%) inhalation solution  -- 0.5 milliliter(s) inhaled every 6 hours  -- Indication: For SOB    ipratropium-albuterol 0.5 mg-2.5 mg/3 mLinhalation solution  -- 3 milliliter(s) inhaled every 6 hours  -- Indication: For SOB    NIFEdipine 60 mg oral tablet, extended release  -- 1 tab(s) by mouth once a day  -- Indication: For HTN    cefepime  -- 500 milligram(s) intravenous every 48 hours  -- Indication: For PNEUMONIA    bacitracin 500 units/g topical ointment  -- 1 application on skin 2 times a day  -- Indication: For SKin ulcer    vitamin A & D topical ointment  -- 1 application on skin 2 times a day  -- Indication: For WOund care     vancomycin 750 mg intravenous injection  -- 750 milligram(s) intravenous   -- Indication: For PNEUMONIA    Senokot 8.6 mg oral tablet  -- 2 tab(s) by mouth once a day (at bedtime)  -- Indication: For Constipation    MiraLax oral powder for reconstitution  -- 17 gram(s) by mouth once a day  -- Indication: For Constpation     lactulose 10 g/15 mL oral syrup  -- 15 milliliter(s) by mouth once a day  -- Indication: For Constipation    Colace 100 mg oral capsule  -- 2 cap(s) by mouth once a day  -- Indication: For Constipation    simethicone 40 mg/0.6 mL oral liquid  -- 0.6 milliliter(s) by mouth 4 times a day (after meals and at bedtime)  -- Indication: For Dyspepsia    Renvela 800 mg oral tablet  -- 1 tab(s) by mouth 3 times a day (with meals)  -- Indication: For CKD    PriLOSEC 20 mg oral delayed release capsule  -- 1 cap(s) by mouth once a day  -- Indication: For GERD

## 2018-02-16 NOTE — CONSULT NOTE ADULT - SUBJECTIVE AND OBJECTIVE BOX
KEE GARCIA  86y, Male  Allergy: No Known Allergies    HPI:  This is an 85 YO M, PMH ESRD on HD, CAD s/p CABG 2016 and PCI 2017, AFib, recurrent pleural effusions, DM, presents from [facility] with 2 days of lethargy, subjective fevers, low BP, cough, and hypoxia 78% on 3-4 liters NC. He is a poor historian, cantonese speaking, family at bedside to interpret, but patient is currently on BiPAP and difficulty answering questions. Denies pain or discomfort at this time. Per daughter, patient was on Tamiflu last month as prophylaxis due to outbreak at NH.    MEDICATIONS GIVEN IN ED:  Cefepime, Azithromycin    FAMILY HISTORY:  No pertinent family history in first degree relatives    PAST MEDICAL & SURGICAL HISTORY:  Hypertension, unspecified type  Chronic obstructive pulmonary disease, unspecified COPD type  Atrial fibrillation  Pleural effusion due to another disorder  Diabetes  Coronary artery disease involving coronary bypass graft without angina pectoris, unspecified whether native or transplanted heart  Hemodialysis access, fistula mature  ESRD (end stage renal disease)  A-V fistula  S/P CABG x 3    VITALS:  T(F): 96.2, Max: 97.9 (02-15-18 @ 14:30)  HR: 75  BP: 127/98  RR: 20Vital Signs Last 24 Hrs  T(C): 35.7 (16 Feb 2018 05:20), Max: 36.6 (15 Feb 2018 14:30)  T(F): 96.2 (16 Feb 2018 05:20), Max: 97.9 (15 Feb 2018 14:30)  HR: 75 (16 Feb 2018 08:30) (75 - 80)  BP: 127/98 (16 Feb 2018 08:30) (105/50 - 150/63)  BP(mean): --  RR: 20 (16 Feb 2018 08:30) (17 - 20)  SpO2: 97% (15 Feb 2018 21:00) (95% - 99%)    TESTS & MEASUREMENTS:                        7.9    15.11 )-----------( 163      ( 16 Feb 2018 06:14 )             27.9     02-16    138  |  98  |  77<HH>  ----------------------------<  154<H>  4.8   |  27  |  5.0<HH>    Ca    8.5      16 Feb 2018 06:14    RADIOLOGY & ADDITIONAL TESTS:  < from: Xray Chest 1 View AP/PA (02.11.18 @ 17:48) >  Impression:      Large right-sided pleural effusion which is increased in size from the   prior study.    < end of copied text >    < from: Xray Chest 1 View- PORTABLE-Routine (02.13.18 @ 07:55) >    Impression:      Stable pulmonary vascular congestion with large right and small left   pleural effusions with adjacent opacities.     < end of copied text >    < from: Xray Chest 2 Views PA/Lat (02.14.18 @ 21:33) >  Impression:      Stable vascular congestion and right greater than left effusions.    < end of copied text >    < from: VA Duplex Upper Ext Vein Scan, Left (02.14.18 @ 09:06) >  Impression:    No evidence of deep or superficial thrombosis in the upper extremity.   Patent left AV graft    < end of copied text >      ANTIBIOTICS:  cefepime  IVPB 500 milliGRAM(s) IV Intermittent every 24 hours  cefepime  IVPB      vancomycin  IVPB 500 milliGRAM(s) IV Intermittent <User Schedule> PRN

## 2018-02-16 NOTE — DISCHARGE NOTE ADULT - HOSPITAL COURSE
This is an 85 YO M, PMH ESRD on HD, CAD s/p CABG 2016 and PCI 2017, AFib, recurrent pleural effusions, DM, presents from [facility] with 2 days of lethargy, subjective fevers, low BP, cough, and hypoxia 78% on 3-4 liters NC. Pt was being treated with iv antibiotics and sent to NH with iv antibiotics for 9 more days. Pt also has pleural effusion on the right side most likely 2/2 CHF. Prognosis is poor. Pt needs oxygen at home.

## 2018-02-16 NOTE — CONSULT NOTE ADULT - SUBJECTIVE AND OBJECTIVE BOX
KEE GARCIA  86y, Male  Allergy: No Known Allergies      HPI:  This is an 85 YO M, PMH ESRD on HD, CAD s/p CABG 2016 and PCI 2017, AFib, recurrent pleural effusions, DM, presents from [facility] with 2 days of lethargy, subjective fevers, low BP, cough, and hypoxia 78% on 3-4 liters NC. He is a poor historian, cantonese speaking, family at bedside to interpret, but patient is currently on BiPAP and difficulty answering questions. Denies pain or discomfort at this time. Per daughter, patient was on Tamiflu last month as prophylaxis due to outbreak at NH.    MEDICATIONS GIVEN IN ED:  Cefepime, Azithromycin    VITALS:   Vital Signs Last 24 Hrs  T(C): 38.7 (11 Feb 2018 16:20), Max: 38.7 (11 Feb 2018 16:20)  T(F): 101.6 (11 Feb 2018 16:20), Max: 101.6 (11 Feb 2018 16:20)  HR: 82 (11 Feb 2018 18:03) (81 - 84)  BP: 110/54 (11 Feb 2018 18:03) (90/57 - 121/56)  BP(mean): --  RR: 18 (11 Feb 2018 18:03) (18 - 18)  SpO2: 100% (11 Feb 2018 18:03) (100% - 100%) (11 Feb 2018 22:07)    FAMILY HISTORY:  No pertinent family history in first degree relatives    PAST MEDICAL & SURGICAL HISTORY:  Hypertension, unspecified type  Chronic obstructive pulmonary disease, unspecified COPD type  Atrial fibrillation  Pleural effusion due to another disorder  Diabetes  Coronary artery disease involving coronary bypass graft without angina pectoris, unspecified whether native or transplanted heart  Hemodialysis access, fistula mature  ESRD (end stage renal disease)  A-V fistula  S/P CABG x 3        VITALS:  T(F): 96.2, Max: 97.9 (02-15-18 @ 14:30)  HR: 75  BP: 101/56  RR: 20Vital Signs Last 24 Hrs  T(C): 35.7 (16 Feb 2018 05:20), Max: 36.6 (15 Feb 2018 14:30)  T(F): 96.2 (16 Feb 2018 05:20), Max: 97.9 (15 Feb 2018 14:30)  HR: 75 (16 Feb 2018 08:30) (75 - 80)  BP: 101/56 (16 Feb 2018 11:07) (101/56 - 150/63)  BP(mean): --  RR: 20 (16 Feb 2018 11:07) (17 - 20)  SpO2: 97% (15 Feb 2018 21:00) (95% - 99%)    TESTS & MEASUREMENTS:                        7.9    15.11 )-----------( 163      ( 16 Feb 2018 06:14 )             27.9     02-16    138  |  98  |  77<HH>  ----------------------------<  154<H>  4.8   |  27  |  5.0<HH>    Ca    8.5      16 Feb 2018 06:14                RADIOLOGY & ADDITIONAL TESTS:    ANTIBIOTICS:  vancomycin  IVPB 750 milliGRAM(s) IV Intermittent <User Schedule>

## 2018-02-16 NOTE — PROGRESS NOTE ADULT - ASSESSMENT
# ESRD on HD for HD today   -3h /opti 160 / 2K /UF 3 l as tolerated/ left upper arm fistula  - may need to add Midodrine prior to next HD     # PNA  - C/W cefepime   - f/u with ID    # Anemia 2/2 CKD with iron deficiency v/s inflammation  - check iron study, ferritin, TIBC, CRP, VIT b12, and folic acid   - if HB < 7.0, PRBC transfused.  - will start aranesp 20 mg iv posthemo qweek  - monitor CBC closely. # ESRD on HD for HD today   -3h /opti 160 / 2K /UF 3 l as tolerated/ left upper arm fistula  - may need to add Midodrine prior to next HD     # PNA  - C/W cefepime   - f/u with ID    # Anemia 2/2 CKD with iron deficiency v/s inflammation: ferritin pending  Iron with Total Binding Capacity in AM (02.14.18 @ 08:11)    % Saturation, Iron: 21 %    Iron - Total Binding Capacity.: 199 ug/mL    Iron Total, Serum: 42 ug/dL    Unsaturated Iron Binding Capacity: TNP ug/dL  - IF ferritin < 500, will start iv iron.  - if HB < 7.0, PRBC transfused.    #HTN well controlled  - monitor BP closely  - c/w antihypertensives  - will start aranesp 20 mg iv posthemo qweek- monitor CBC closely. # ESRD on HD for HD today   -3h /opti 160 / 2K /UF 3 l as tolerated/ left upper arm fistula  - BP noted better today on HD    # PNA  - C/W cefepime   - f/u with ID    # Anemia 2/2 CKD with iron deficiency v/s inflammation: ferritin pending  will start Aranesp with HD today   Transfuse if Hb<7

## 2018-02-16 NOTE — CONSULT NOTE ADULT - ASSESSMENT
Patient is a 85yo male with pmh significant for ESRD, HTN, CAD s/p cabg and pci in 2017, AFib not on AC and recurrent pleural effusions presenting from NH for fever, lethargy and hypoxia for 2 days.     Sepsis 2/2 to PNA  - leukocytosis slow to resolve   - RVP negative  - Blood Cx pending, UA pending, Sputum cx and Urine Cx pending   - Afebrile over 48 hours   - vanco trough 7.9   - would continue vancomycin 750 mg with dialysis and cefepime 500 post HD    prognosis is very poor. Essential would need a CT chest and possibly a thoracocentesis. Therapy is conservative

## 2018-02-19 ENCOUNTER — INPATIENT (INPATIENT)
Facility: HOSPITAL | Age: 83
LOS: 0 days | End: 2018-02-20
Attending: INTERNAL MEDICINE | Admitting: INTERNAL MEDICINE

## 2018-02-19 ENCOUNTER — OUTPATIENT (OUTPATIENT)
Dept: OUTPATIENT SERVICES | Facility: HOSPITAL | Age: 83
LOS: 1 days | Discharge: HOME | End: 2018-02-19

## 2018-02-19 VITALS
OXYGEN SATURATION: 100 % | HEART RATE: 80 BPM | SYSTOLIC BLOOD PRESSURE: 148 MMHG | DIASTOLIC BLOOD PRESSURE: 61 MMHG | TEMPERATURE: 100 F

## 2018-02-19 DIAGNOSIS — I77.0 ARTERIOVENOUS FISTULA, ACQUIRED: Chronic | ICD-10-CM

## 2018-02-19 DIAGNOSIS — Z95.1 PRESENCE OF AORTOCORONARY BYPASS GRAFT: Chronic | ICD-10-CM

## 2018-02-19 LAB
ALBUMIN SERPL ELPH-MCNC: 2.2 G/DL — LOW (ref 3–5.5)
ALP SERPL-CCNC: 212 U/L — HIGH (ref 30–115)
ALT FLD-CCNC: 14 U/L — SIGNIFICANT CHANGE UP (ref 0–41)
ANION GAP SERPL CALC-SCNC: 10 MMOL/L — SIGNIFICANT CHANGE UP (ref 7–14)
AST SERPL-CCNC: 39 U/L — SIGNIFICANT CHANGE UP (ref 0–41)
BASE EXCESS BLDV CALC-SCNC: 5.8 MMOL/L — HIGH (ref -2–2)
BASOPHILS # BLD AUTO: 0.01 K/UL — SIGNIFICANT CHANGE UP (ref 0–0.2)
BASOPHILS NFR BLD AUTO: 0.1 % — SIGNIFICANT CHANGE UP (ref 0–1)
BILIRUB SERPL-MCNC: 1.9 MG/DL — HIGH (ref 0.2–1.2)
BUN SERPL-MCNC: 41 MG/DL — HIGH (ref 10–20)
CA-I SERPL-SCNC: 1.23 MMOL/L — SIGNIFICANT CHANGE UP (ref 1.12–1.3)
CALCIUM SERPL-MCNC: 8.2 MG/DL — LOW (ref 8.5–10.1)
CHLORIDE SERPL-SCNC: 99 MMOL/L — SIGNIFICANT CHANGE UP (ref 98–110)
CK MB CFR SERPL CALC: 2.5 NG/ML — SIGNIFICANT CHANGE UP (ref 0.6–6.3)
CO2 SERPL-SCNC: 30 MMOL/L — SIGNIFICANT CHANGE UP (ref 17–32)
CREAT SERPL-MCNC: 3.3 MG/DL — HIGH (ref 0.7–1.5)
EOSINOPHIL # BLD AUTO: 0.05 K/UL — SIGNIFICANT CHANGE UP (ref 0–0.7)
EOSINOPHIL NFR BLD AUTO: 0.3 % — SIGNIFICANT CHANGE UP (ref 0–8)
GAS PNL BLDV: 139 MMOL/L — SIGNIFICANT CHANGE UP (ref 136–145)
GAS PNL BLDV: SIGNIFICANT CHANGE UP
GLUCOSE SERPL-MCNC: 255 MG/DL — HIGH (ref 70–110)
HCO3 BLDV-SCNC: 33 MMOL/L — HIGH (ref 22–29)
HCT VFR BLD CALC: 27.6 % — LOW (ref 42–52)
HGB BLD CALC-MCNC: 10.9 G/DL — LOW (ref 14–18)
HGB BLD-MCNC: 8.2 G/DL — LOW (ref 14–18)
IMM GRANULOCYTES NFR BLD AUTO: 1.5 % — HIGH (ref 0.1–0.3)
LACTATE BLDV-MCNC: 0.8 MMOL/L — SIGNIFICANT CHANGE UP (ref 0.5–1.6)
LACTATE SERPL-SCNC: 1 MMOL/L — SIGNIFICANT CHANGE UP (ref 0.5–2.2)
LYMPHOCYTES # BLD AUTO: 0.18 K/UL — LOW (ref 1.2–3.4)
LYMPHOCYTES # BLD AUTO: 1.2 % — LOW (ref 20.5–51.1)
MCHC RBC-ENTMCNC: 26.9 PG — LOW (ref 27–31)
MCHC RBC-ENTMCNC: 29.7 G/DL — LOW (ref 32–37)
MCV RBC AUTO: 90.5 FL — SIGNIFICANT CHANGE UP (ref 80–94)
MONOCYTES # BLD AUTO: 0.5 K/UL — SIGNIFICANT CHANGE UP (ref 0.1–0.6)
MONOCYTES NFR BLD AUTO: 3.3 % — SIGNIFICANT CHANGE UP (ref 1.7–9.3)
NEUTROPHILS # BLD AUTO: 14 K/UL — HIGH (ref 1.4–6.5)
NEUTROPHILS NFR BLD AUTO: 93.6 % — HIGH (ref 42.2–75.2)
NRBC # BLD: 0 /100 WBCS — SIGNIFICANT CHANGE UP (ref 0–0)
PCO2 BLDV: 60 MMHG — HIGH (ref 41–51)
PH BLDV: 7.34 — SIGNIFICANT CHANGE UP (ref 7.26–7.43)
PLATELET # BLD AUTO: 144 K/UL — SIGNIFICANT CHANGE UP (ref 130–400)
PO2 BLDV: 40 MMHG — SIGNIFICANT CHANGE UP (ref 20–40)
POTASSIUM BLDV-SCNC: 3.9 MMOL/L — SIGNIFICANT CHANGE UP (ref 3.3–5.6)
POTASSIUM SERPL-MCNC: 5.4 MMOL/L — HIGH (ref 3.5–5)
POTASSIUM SERPL-SCNC: 5.4 MMOL/L — HIGH (ref 3.5–5)
PROT SERPL-MCNC: 5.2 G/DL — LOW (ref 6–8)
RBC # BLD: 3.05 M/UL — LOW (ref 4.7–6.1)
RBC # FLD: 19.5 % — HIGH (ref 11.5–14.5)
SAO2 % BLDV: 70 % — SIGNIFICANT CHANGE UP
SODIUM SERPL-SCNC: 139 MMOL/L — SIGNIFICANT CHANGE UP (ref 135–146)
TROPONIN I SERPL-MCNC: 0.06 NG/ML — HIGH (ref 0–0.05)
WBC # BLD: 14.97 K/UL — HIGH (ref 4.8–10.8)
WBC # FLD AUTO: 14.97 K/UL — HIGH (ref 4.8–10.8)

## 2018-02-19 RX ORDER — PANTOPRAZOLE SODIUM 20 MG/1
40 TABLET, DELAYED RELEASE ORAL
Qty: 0 | Refills: 0 | Status: DISCONTINUED | OUTPATIENT
Start: 2018-02-19 | End: 2018-02-20

## 2018-02-19 RX ORDER — NIFEDIPINE 30 MG
60 TABLET, EXTENDED RELEASE 24 HR ORAL DAILY
Qty: 0 | Refills: 0 | Status: DISCONTINUED | OUTPATIENT
Start: 2018-02-19 | End: 2018-02-20

## 2018-02-19 RX ORDER — AMIODARONE HYDROCHLORIDE 400 MG/1
100 TABLET ORAL
Qty: 0 | Refills: 0 | Status: DISCONTINUED | OUTPATIENT
Start: 2018-02-19 | End: 2018-02-20

## 2018-02-19 RX ORDER — POLYETHYLENE GLYCOL 3350 17 G/17G
17 POWDER, FOR SOLUTION ORAL DAILY
Qty: 0 | Refills: 0 | Status: DISCONTINUED | OUTPATIENT
Start: 2018-02-19 | End: 2018-02-20

## 2018-02-19 RX ORDER — DOCUSATE SODIUM 100 MG
200 CAPSULE ORAL DAILY
Qty: 0 | Refills: 0 | Status: DISCONTINUED | OUTPATIENT
Start: 2018-02-19 | End: 2018-02-20

## 2018-02-19 RX ORDER — AMIODARONE HYDROCHLORIDE 400 MG/1
1 TABLET ORAL
Qty: 0 | Refills: 0 | COMMUNITY

## 2018-02-19 RX ORDER — ASPIRIN/CALCIUM CARB/MAGNESIUM 324 MG
1 TABLET ORAL
Qty: 0 | Refills: 0 | COMMUNITY

## 2018-02-19 RX ORDER — SEVELAMER CARBONATE 2400 MG/1
800 POWDER, FOR SUSPENSION ORAL
Qty: 0 | Refills: 0 | Status: DISCONTINUED | OUTPATIENT
Start: 2018-02-19 | End: 2018-02-20

## 2018-02-19 RX ORDER — HEPARIN SODIUM 5000 [USP'U]/ML
5000 INJECTION INTRAVENOUS; SUBCUTANEOUS EVERY 8 HOURS
Qty: 0 | Refills: 0 | Status: DISCONTINUED | OUTPATIENT
Start: 2018-02-19 | End: 2018-02-20

## 2018-02-19 RX ORDER — ASPIRIN/CALCIUM CARB/MAGNESIUM 324 MG
81 TABLET ORAL DAILY
Qty: 0 | Refills: 0 | Status: DISCONTINUED | OUTPATIENT
Start: 2018-02-19 | End: 2018-02-20

## 2018-02-19 RX ORDER — SENNA PLUS 8.6 MG/1
2 TABLET ORAL AT BEDTIME
Qty: 0 | Refills: 0 | Status: DISCONTINUED | OUTPATIENT
Start: 2018-02-19 | End: 2018-02-20

## 2018-02-19 RX ORDER — AMIODARONE HYDROCHLORIDE 400 MG/1
2 TABLET ORAL
Qty: 0 | Refills: 0 | COMMUNITY

## 2018-02-19 RX ORDER — BACITRACIN ZINC 500 UNIT/G
1 OINTMENT IN PACKET (EA) TOPICAL
Qty: 0 | Refills: 0 | Status: DISCONTINUED | OUTPATIENT
Start: 2018-02-19 | End: 2018-02-20

## 2018-02-19 RX ORDER — ATORVASTATIN CALCIUM 80 MG/1
40 TABLET, FILM COATED ORAL AT BEDTIME
Qty: 0 | Refills: 0 | Status: DISCONTINUED | OUTPATIENT
Start: 2018-02-19 | End: 2018-02-20

## 2018-02-19 RX ORDER — IPRATROPIUM/ALBUTEROL SULFATE 18-103MCG
3 AEROSOL WITH ADAPTER (GRAM) INHALATION EVERY 6 HOURS
Qty: 0 | Refills: 0 | Status: DISCONTINUED | OUTPATIENT
Start: 2018-02-19 | End: 2018-02-20

## 2018-02-19 RX ORDER — CLOPIDOGREL BISULFATE 75 MG/1
75 TABLET, FILM COATED ORAL DAILY
Qty: 0 | Refills: 0 | Status: DISCONTINUED | OUTPATIENT
Start: 2018-02-19 | End: 2018-02-20

## 2018-02-19 RX ADMIN — SENNA PLUS 2 TABLET(S): 8.6 TABLET ORAL at 22:22

## 2018-02-19 RX ADMIN — Medication 3 MILLILITER(S): at 22:14

## 2018-02-19 RX ADMIN — ATORVASTATIN CALCIUM 40 MILLIGRAM(S): 80 TABLET, FILM COATED ORAL at 22:22

## 2018-02-19 RX ADMIN — HEPARIN SODIUM 5000 UNIT(S): 5000 INJECTION INTRAVENOUS; SUBCUTANEOUS at 22:22

## 2018-02-19 NOTE — ED PROVIDER NOTE - PHYSICAL EXAMINATION
CONSTITUTIONAL: Well-developed; well-nourished; in no acute distress.   SKIN: warm, dry. Ecchymoses over arms and fistula.   HEAD: Normocephalic; atraumatic.  EYES: PERRL, EOMI, normal sclera and conjunctiva   ENT: No nasal discharge; airway clear.  NECK: Supple; non tender.  CARD: S1, S2 normal; no murmurs, gallops, or rubs. Regular rate and rhythm.   RESP: Decreased breath sounds bilaterally at the bases.   ABD: soft ntnd. Bowel sounds normal.   EXT: Trace pitting edema. No posterior calf ttp. Normal ROM.  No clubbing, cyanosis  LYMPH: No acute cervical adenopathy.  NEURO: Alert, oriented, grossly unremarkable. No cranial nerve deficits, moving all extremities well.   PSYCH: Cooperative, appropriate.

## 2018-02-19 NOTE — H&P ADULT - HISTORY OF PRESENT ILLNESS
87 yo male patient with PMHx of ESRD on HD (M/W/F), CAD s/p CABG, paroxysmal AF not on AC, HTN, DL, Systolic CHF (EF 15-25%), sent from [facility] because of the above chief complaint.  Patient was recently admitted to [facility] (on 2/11/18) because of shortness of breath, was found to be in fluid overload (CHF and pleural effusion) with possible underlying pneumonia, s/p antibiotherapy and HD, improved, and was discharged to NH again 4 days prior to his presentation.  Daughter says that her father was still feeling weak, not participating in PT, staying in wheelchair most of his time. He was not having any new cough, fever, or respiratory symptoms.  He was eating well, no GI or  symptoms.  On the day of presentation, while he was getting dialyzed, patient started feeling short of breath. According to NH papers, patient was on dialysis for 2h (they were able to remove 1L of fluid) before his symptoms started. They decided to send him to the ED for evaluation.    When I saw the patient in the ED, he was doing fine, complains of inability to cough his secretions (feels like something is stuck in his throat) and said he had some sore throat.  No other problems

## 2018-02-19 NOTE — ED PROVIDER NOTE - NS ED ROS FT
Eyes:  No visual changes, eye pain or discharge.  ENMT:  No hearing changes, pain, discharge or infections. No neck pain or stiffness.  Cardiac:  SOB. No chest pain, edema.   Respiratory:  +cough, sob. No hemoptysis. No history of asthma or RAD.  GI:  No nausea, vomiting, diarrhea or abdominal pain.  :  No dysuria, frequency or burning.  MS:  No myalgia, muscle weakness, joint pain or back pain.  Neuro:  No headache or weakness.  No LOC.  Skin:  No skin rash.

## 2018-02-19 NOTE — H&P ADULT - ATTENDING COMMENTS
1. SOB 2/2  Fluid overload       acute on chronic systolic CHF; Bilateral large effusions; R> L  Possible aspiration (debris in right bronchus)      No evidence of pneumonia on CT.      Will hold off on ABx         Pulmonary eval possible thoracentesis      supplement O2 as needed; continue with nebulizer treatment; Mucinex    2. ESRD      Renal eval     3.  CAD/CABG (stable): negative enzymes and ECG  will continue with medical therapy    4.   Paroxysmal AF (currently in sinus, rate controlled)      not on AC      continue with amio    5.  Difficulty ambulating      PT eval     6. DVT/ GI   proph     7. DC planning to   SNF    # Diet: renal diet, heart healthy

## 2018-02-19 NOTE — H&P ADULT - NSHPSOCIALHISTORY_GEN_ALL_CORE
Social History:    Lives with: Nursing home    Substance Use (street drugs): ( x ) never used  (  ) other:  Tobacco Usage:  (   ) never smoked   (  x ) former smoker   (   ) current smoker  (     ) pack year  (        ) last cigarette date  Alcohol Usage: negative

## 2018-02-19 NOTE — ED PROVIDER NOTE - ATTENDING CONTRIBUTION TO CARE
I have reviewed triage notes and vital signs available at this time.  I have reviewed lab results, if any, available at this time.  I have reviewed EKGs, if any, available at this time.     I have reviewed radiology results and radiographs/scans, if any, available at this time.      I have personally seen, evaluated and participated in this patient's care and find this patient's history and physical examination are consistent with the chart documentation, unless noted below.  ***  Patient here for shortness of breath at dialysis. CXR and labs pending. hemodynamically stable.

## 2018-02-19 NOTE — ED PROVIDER NOTE - OBJECTIVE STATEMENT
86 y m pmh cad, afib, dm, esrd on dialysis, htn pw sob. SOB today at dialysis. Had a couple liters taken off and felt sob. Decreased oxygen saturation at the time. Associated with cough. Hx of pleural effusion last week, seen in hospital and discharged. Denies cp, abdominal pain, fever, chills.

## 2018-02-19 NOTE — H&P ADULT - PMH
Acute on chronic systolic heart failure  EF 15-25%  Atrial fibrillation    Coronary artery disease involving coronary bypass graft without angina pectoris, unspecified whether native or transplanted heart    Diabetes    ESRD (end stage renal disease)    Hemodialysis access, fistula mature    Hypertension, unspecified type    Pleural effusion due to another disorder

## 2018-02-19 NOTE — ED PROVIDER NOTE - CARE PLAN
Principal Discharge DX:	Pleural effusion  Secondary Diagnosis:	ESRD (end stage renal disease)  Secondary Diagnosis:	Hyperkalemia

## 2018-02-19 NOTE — H&P ADULT - ASSESSMENT
87 yo male patient with PMHx of ESRD on HD, CAD/CABG, systolic CHF (EF 15-25%), Paroxysmal AF on amio (no AC), HTN, DL, presenting because of worsening shortness of breath during dialysis session on the day of presentation.  Recent history of Pneumonia/CHF admitted to [facility] a week ago then discharged to [facility]    CXR and CT showing large effusions more on the right, CHF, and debris in the right bronchus    # Working diagnosis: Fluid overload with acute on chronic systolic CHF; Bilateral large effusions; Possible aspiration (debris in right bronchus)    Admit to Medicine  No evidence of pneumonia on CT. Will hold off on ABx (patient just finished abx course of pneumonia with cefepime and vanc)  Needs Renal consult for aggressive HD (patient in fluid overload and CHF)  Consider Pulmonary consult because of bilateral large pulmonary effusions, more on the right (needs thoracentesis)  supplement O2 as needed  continue with nebulizer treatment    # CAD/CABG (stable): negative enzymes and ECG  will continue with medical therapy    # Paroxysmal AF (currently in sinus, rate controlled)  not on AC  continue with amio    # Difficulty ambulating  need PT rehab    # DVT proph  # GI proph  # DNR  # Dispo: SNF  # Diet: renal diet, heart healthy

## 2018-02-19 NOTE — H&P ADULT - NSHPLABSRESULTS_GEN_ALL_CORE
8.2    14.97 )-----------( 144      ( 19 Feb 2018 16:18 )             27.6       02-19    139  |  99  |  41<H>  ----------------------------<  255<H>  5.4<H>   |  30  |  3.3<H>    Ca    8.2<L>      19 Feb 2018 16:18    TPro  5.2<L>  /  Alb  2.2<L>  /  TBili  1.9<H>  /  DBili  x   /  AST  39  /  ALT  14  /  AlkPhos  212<H>  02-19                      Lactate Trend  02-19 @ 16:18 Lactate:1.0       CARDIAC MARKERS ( 19 Feb 2018 16:18 )  0.06 ng/mL / x     / x     / x     / 2.5 ng/mL    < from: CT Chest w/ IV Cont (02.19.18 @ 17:59) >    FINDINGS:    LUNGS, PLEURA, AIRWAYS: Bilateral large, right greater than left, pleural   effusions with overlying compressive atelectasis. Left apical groundglass   opacity. Anterior right lung base probable focal atelectasis (series 3,   image 157) debris within right mainstem bronchus (series 3, image 125).   No pneumothorax. Evaluation for pulmonary nodules limited by patient   condition.    < end of copied text >    ECG: sinus rhythm, 1st degree AV block, non specific ST-T wave changes

## 2018-02-19 NOTE — ED PROVIDER NOTE - MEDICAL DECISION MAKING DETAILS
Chart finished. Pt on Vanco/Cefepime for PNA with symptomatic large pleural effusion.  Incomplete dialysis with hyperkalemia noted.  Renal aware. Patient to be admitted to an inpatient floor. Case discussed with and care endorsed to medical admitting resident. Admitting physician Dr. Ordonez notified.     Chart finished.

## 2018-02-19 NOTE — ED PROVIDER NOTE - PROGRESS NOTE DETAILS
patient care transferred to Dr. Brown Case d/w Renal Fellow Dr. Jose Antonio wagner states pt will go for dialysis tomorrow. Spoke with Dr. Ordonez. Said he will come see patient today. No further recommendations at this time. Said to admit him to his service.

## 2018-02-19 NOTE — H&P ADULT - NSHPPHYSICALEXAM_GEN_ALL_CORE
PHYSICAL EXAM:    T(F): 98.4, Max: 99.6 (02-19-18 @ 15:57)  HR: 74  BP: 131/63  RR: 17  SpO2: 100% on 3L NC    GENERAL: NAD, well-developed  HEAD:  Atraumatic, Normocephalic  EYES: EOMI, PERRLA, conjunctiva and sclera clear  NECK: Supple, JVD present  CHEST/LUNG: Bilaterally decreased air entry at the basis (more on the right), bilateral crackles, no wheezing  HEART: Regular rate and rhythm; systolic murmur 3/6 aortic area  ABDOMEN: Soft, Nontender, Nondistended; Bowel sounds present  EXTREMITIES:  2+ Peripheral Pulses, No clubbing, cyanosis, or edema  PSYCH: AAOx3  NEUROLOGY: non-focal

## 2018-02-20 VITALS — OXYGEN SATURATION: 97 %

## 2018-02-20 DIAGNOSIS — Z95.1 PRESENCE OF AORTOCORONARY BYPASS GRAFT: ICD-10-CM

## 2018-02-20 DIAGNOSIS — D50.9 IRON DEFICIENCY ANEMIA, UNSPECIFIED: ICD-10-CM

## 2018-02-20 DIAGNOSIS — J90 PLEURAL EFFUSION, NOT ELSEWHERE CLASSIFIED: ICD-10-CM

## 2018-02-20 DIAGNOSIS — Z66 DO NOT RESUSCITATE: ICD-10-CM

## 2018-02-20 DIAGNOSIS — J18.9 PNEUMONIA, UNSPECIFIED ORGANISM: ICD-10-CM

## 2018-02-20 DIAGNOSIS — J44.9 CHRONIC OBSTRUCTIVE PULMONARY DISEASE, UNSPECIFIED: ICD-10-CM

## 2018-02-20 DIAGNOSIS — Z99.2 DEPENDENCE ON RENAL DIALYSIS: ICD-10-CM

## 2018-02-20 DIAGNOSIS — E11.22 TYPE 2 DIABETES MELLITUS WITH DIABETIC CHRONIC KIDNEY DISEASE: ICD-10-CM

## 2018-02-20 DIAGNOSIS — I48.2 CHRONIC ATRIAL FIBRILLATION: ICD-10-CM

## 2018-02-20 DIAGNOSIS — N18.6 END STAGE RENAL DISEASE: ICD-10-CM

## 2018-02-20 DIAGNOSIS — I12.0 HYPERTENSIVE CHRONIC KIDNEY DISEASE WITH STAGE 5 CHRONIC KIDNEY DISEASE OR END STAGE RENAL DISEASE: ICD-10-CM

## 2018-02-20 DIAGNOSIS — L89.159 PRESSURE ULCER OF SACRAL REGION, UNSPECIFIED STAGE: ICD-10-CM

## 2018-02-20 DIAGNOSIS — Z79.02 LONG TERM (CURRENT) USE OF ANTITHROMBOTICS/ANTIPLATELETS: ICD-10-CM

## 2018-02-20 DIAGNOSIS — A41.9 SEPSIS, UNSPECIFIED ORGANISM: ICD-10-CM

## 2018-02-20 DIAGNOSIS — D63.1 ANEMIA IN CHRONIC KIDNEY DISEASE: ICD-10-CM

## 2018-02-20 DIAGNOSIS — Z79.82 LONG TERM (CURRENT) USE OF ASPIRIN: ICD-10-CM

## 2018-02-20 DIAGNOSIS — Z02.9 ENCOUNTER FOR ADMINISTRATIVE EXAMINATIONS, UNSPECIFIED: ICD-10-CM

## 2018-02-20 DIAGNOSIS — I25.10 ATHEROSCLEROTIC HEART DISEASE OF NATIVE CORONARY ARTERY WITHOUT ANGINA PECTORIS: ICD-10-CM

## 2018-02-20 LAB
ANION GAP SERPL CALC-SCNC: 13 MMOL/L — SIGNIFICANT CHANGE UP (ref 7–14)
B-TYPE NATRIURETIC PEPTIDE BNP RESULT: >4902 PG/ML — HIGH (ref 0–99)
BASOPHILS # BLD AUTO: 0.01 K/UL — SIGNIFICANT CHANGE UP (ref 0–0.2)
BASOPHILS NFR BLD AUTO: 0.1 % — SIGNIFICANT CHANGE UP (ref 0–1)
BUN SERPL-MCNC: 66 MG/DL — CRITICAL HIGH (ref 10–20)
CALCIUM SERPL-MCNC: 8.5 MG/DL — SIGNIFICANT CHANGE UP (ref 8.5–10.1)
CHLORIDE SERPL-SCNC: 100 MMOL/L — SIGNIFICANT CHANGE UP (ref 98–110)
CK MB BLD-MCNC: 12 % — HIGH (ref 0–4)
CK MB CFR SERPL CALC: 2.3 NG/ML — SIGNIFICANT CHANGE UP (ref 0.6–6.3)
CK SERPL-CCNC: 20 U/L — SIGNIFICANT CHANGE UP (ref 0–225)
CO2 SERPL-SCNC: 26 MMOL/L — SIGNIFICANT CHANGE UP (ref 17–32)
CREAT SERPL-MCNC: 4.7 MG/DL — CRITICAL HIGH (ref 0.7–1.5)
CULTURE RESULTS: SIGNIFICANT CHANGE UP
EOSINOPHIL # BLD AUTO: 0.02 K/UL — SIGNIFICANT CHANGE UP (ref 0–0.7)
EOSINOPHIL NFR BLD AUTO: 0.2 % — SIGNIFICANT CHANGE UP (ref 0–8)
GLUCOSE SERPL-MCNC: 184 MG/DL — HIGH (ref 70–110)
HCT VFR BLD CALC: 28.4 % — LOW (ref 42–52)
HGB BLD-MCNC: 8.1 G/DL — LOW (ref 14–18)
IMM GRANULOCYTES NFR BLD AUTO: 2.7 % — HIGH (ref 0.1–0.3)
LYMPHOCYTES # BLD AUTO: 0.28 K/UL — LOW (ref 1.2–3.4)
LYMPHOCYTES # BLD AUTO: 2.1 % — LOW (ref 20.5–51.1)
MCHC RBC-ENTMCNC: 25.9 PG — LOW (ref 27–31)
MCHC RBC-ENTMCNC: 28.5 G/DL — LOW (ref 32–37)
MCV RBC AUTO: 90.7 FL — SIGNIFICANT CHANGE UP (ref 80–94)
MONOCYTES # BLD AUTO: 0.59 K/UL — SIGNIFICANT CHANGE UP (ref 0.1–0.6)
MONOCYTES NFR BLD AUTO: 4.5 % — SIGNIFICANT CHANGE UP (ref 1.7–9.3)
NEUTROPHILS # BLD AUTO: 11.79 K/UL — HIGH (ref 1.4–6.5)
NEUTROPHILS NFR BLD AUTO: 90.4 % — HIGH (ref 42.2–75.2)
PLATELET # BLD AUTO: 183 K/UL — SIGNIFICANT CHANGE UP (ref 130–400)
POTASSIUM SERPL-MCNC: 4.6 MMOL/L — SIGNIFICANT CHANGE UP (ref 3.5–5)
POTASSIUM SERPL-SCNC: 4.6 MMOL/L — SIGNIFICANT CHANGE UP (ref 3.5–5)
RBC # BLD: 3.13 M/UL — LOW (ref 4.7–6.1)
RBC # FLD: 19.9 % — HIGH (ref 11.5–14.5)
SODIUM SERPL-SCNC: 139 MMOL/L — SIGNIFICANT CHANGE UP (ref 135–146)
SPECIMEN SOURCE: SIGNIFICANT CHANGE UP
TROPONIN I SERPL-MCNC: 0.05 NG/ML — SIGNIFICANT CHANGE UP (ref 0–0.05)
WBC # BLD: 13.04 K/UL — HIGH (ref 4.8–10.8)
WBC # FLD AUTO: 13.04 K/UL — HIGH (ref 4.8–10.8)

## 2018-02-20 RX ORDER — ACETAMINOPHEN 500 MG
650 TABLET ORAL EVERY 6 HOURS
Qty: 0 | Refills: 0 | Status: DISCONTINUED | OUTPATIENT
Start: 2018-02-20 | End: 2018-02-20

## 2018-02-20 RX ADMIN — HEPARIN SODIUM 5000 UNIT(S): 5000 INJECTION INTRAVENOUS; SUBCUTANEOUS at 05:22

## 2018-02-20 RX ADMIN — Medication 3 MILLILITER(S): at 08:56

## 2018-02-20 RX ADMIN — SEVELAMER CARBONATE 800 MILLIGRAM(S): 2400 POWDER, FOR SUSPENSION ORAL at 09:10

## 2018-02-20 RX ADMIN — Medication 600 MILLIGRAM(S): at 05:20

## 2018-02-20 RX ADMIN — Medication 600 MILLIGRAM(S): at 01:44

## 2018-02-20 RX ADMIN — AMIODARONE HYDROCHLORIDE 100 MILLIGRAM(S): 400 TABLET ORAL at 05:21

## 2018-02-20 RX ADMIN — Medication 60 MILLIGRAM(S): at 05:20

## 2018-02-20 RX ADMIN — Medication 5 MILLIGRAM(S): at 05:20

## 2018-02-20 RX ADMIN — Medication 1 APPLICATION(S): at 05:20

## 2018-02-20 RX ADMIN — PANTOPRAZOLE SODIUM 40 MILLIGRAM(S): 20 TABLET, DELAYED RELEASE ORAL at 09:10

## 2018-02-20 RX ADMIN — Medication 650 MILLIGRAM(S): at 02:31

## 2018-02-20 NOTE — CONSULT NOTE ADULT - SUBJECTIVE AND OBJECTIVE BOX
Patient is a 86y old  Male who presents with a chief complaint of SOB (19 Feb 2018 23:42)      HPI:  85 yo male patient with PMHx of ESRD on HD (M/W/F), CAD s/p CABG, paroxysmal AF not on AC, HTN, DL, Systolic CHF (EF 15-25%), sent from [facility] because of the above chief complaint.  Patient was recently admitted to [facility] (on 2/11/18) because of shortness of breath, was found to be in fluid overload (CHF and pleural effusion) with possible underlying pneumonia, s/p antibiotherapy and HD, improved, and was discharged to NH again 4 days prior to his presentation. Thoracentesis was not done during the last admission as patient had stable chronic effusion with no respiratory distress.    On the day of presentation, while he was getting dialyzed, patient started feeling short of breath. According to NH papers, patient was on dialysis for 2h (they were able to remove 1L of fluid) before his symptoms started. They decided to send him to the ED for evaluation.    PAST MEDICAL & SURGICAL HISTORY:  Acute on chronic systolic heart failure: EF 15-25%  Hypertension, unspecified type  Atrial fibrillation  Pleural effusion due to another disorder  Diabetes  Coronary artery disease involving coronary bypass graft without angina pectoris, unspecified whether native or transplanted heart  Hemodialysis access, fistula mature  ESRD (end stage renal disease)  A-V fistula  S/P CABG x 3                             FAMILY HISTORY:  No pertinent family history in first degree relatives          Allergies    No Known Allergies              PHYSICAL EXAM    T(C): 35.5 (02-20-18 @ 07:20), Max: 37.6 (02-19-18 @ 15:57)  HR: 68 (02-20-18 @ 07:20) (68 - 87)  BP: 102/48 (02-20-18 @ 07:20) (102/48 - 159/72)  RR: 18 (02-20-18 @ 07:20) (17 - 19)  SpO2: 100% (02-19-18 @ 19:15) (100% - 100%)            General:    HEENT: AAO x3            Lymph Nodes: No lymphadenapathy  Neck:  Supple  Lungs: decreased breathe sound rt>>left  Cardiovascular: S1S2  Abdomen: Soft, non tender  Extremities: NO edema or cyanosis  Skin: Intact          LAB:                        8.1    13.04 )-----------( 183      ( 20 Feb 2018 06:43 )             28.4                                               02-20    139  |  100  |  66<HH>  ----------------------------<  184<H>  4.6   |  26  |  4.7<HH>    Ca    8.5      20 Feb 2018 06:43    TPro  5.2<L>  /  Alb  2.2<L>  /  TBili  1.9<H>  /  DBili  x   /  AST  39  /  ALT  14  /  AlkPhos  212<H>  02-19                                               CARDIAC MARKERS ( 20 Feb 2018 06:43 )  0.05 ng/mL / x     / 20 U/L / x     / 2.3 ng/mL  CARDIAC MARKERS ( 19 Feb 2018 16:18 )  0.06 ng/mL / x     / x     / x     / 2.5 ng/mL                                            LIVER FUNCTIONS - ( 19 Feb 2018 16:18 )  Alb: 2.2 g/dL / Pro: 5.2 g/dL / ALK PHOS: 212 U/L / ALT: 14 U/L / AST: 39 U/L / GGT: x       B-Type Natriuretic Peptide (02.20.18 @ 06:43)    B-Type Natriuretic Peptide: >4902: OVR pg/mL           MEDICATIONS  (STANDING):  ALBUTerol/ipratropium for Nebulization 3 milliLiter(s) Nebulizer every 6 hours  amiodarone   Oral Tab/Cap - Peds 100 milliGRAM(s) Oral two times a day  aspirin enteric coated 81 milliGRAM(s) Oral daily  atorvastatin Oral Tab/Cap - Peds 40 milliGRAM(s) Oral at bedtime  BACItracin   Ointment 1 Application(s) Topical two times a day  clopidogrel Tablet 75 milliGRAM(s) Oral daily  docusate sodium Oral Tab/Cap - Peds 200 milliGRAM(s) Oral daily  enalapril 5 milliGRAM(s) Oral daily  guaiFENesin  milliGRAM(s) Oral every 12 hours  heparin  Injectable 5000 Unit(s) SubCutaneous every 8 hours  NIFEdipine XL 60 milliGRAM(s) Oral daily  pantoprazole    Tablet 40 milliGRAM(s) Oral before breakfast  polyethylene glycol 3350 17 Gram(s) Oral daily  predniSONE   Tablet 5 milliGRAM(s) Oral daily  senna Oral Tab/Cap - Peds 2 Tablet(s) Oral at bedtime  sevelamer hydrochloride 800 milliGRAM(s) Oral three times a day with meals    MEDICATIONS  (PRN):  acetaminophen   Tablet. 650 milliGRAM(s) Oral every 6 hours PRN Mild Pain (1 - 3) Patient is a 86y old  Male who presents with a chief complaint of SOB (19 Feb 2018 23:42)      HPI:  87 yo male patient with PMHx of ESRD on HD (M/W/F), CAD s/p CABG, paroxysmal AF not on AC, HTN, DL, Systolic CHF (EF 15-25%), sent from [facility] because of the above chief complaint.  Patient was recently admitted to [facility] (on 2/11/18) because of shortness of breath, was found to be in fluid overload (CHF and pleural effusion) with possible underlying pneumonia, s/p antibiotherapy and HD, improved, and was discharged to NH again 4 days prior to his presentation. Thoracentesis was not done during the last admission as patient had stable chronic effusion with no respiratory distress.    On the day of presentation, while he was getting dialyzed, patient started feeling short of breath. According to NH papers, patient was on dialysis for 2h (they were able to remove 1L of fluid) before his symptoms started. They decided to send him to the ED for evaluation.    PAST MEDICAL & SURGICAL HISTORY:  Acute on chronic systolic heart failure: EF 15-25%  Hypertension, unspecified type  Atrial fibrillation  Pleural effusion due to another disorder  Diabetes  Coronary artery disease involving coronary bypass graft without angina pectoris, unspecified whether native or transplanted heart  Hemodialysis access, fistula mature  ESRD (end stage renal disease)  A-V fistula  S/P CABG x 3       FAMILY HISTORY:  No pertinent family history in first degree relatives    Allergies    No Known Allergies              PHYSICAL EXAM    T(C): 35.5 (02-20-18 @ 07:20), Max: 37.6 (02-19-18 @ 15:57)  HR: 68 (02-20-18 @ 07:20) (68 - 87)  BP: 102/48 (02-20-18 @ 07:20) (102/48 - 159/72)  RR: 18 (02-20-18 @ 07:20) (17 - 19)  SpO2: 100% (02-19-18 @ 19:15) (100% - 100%)            General:    HEENT: AAO x3            Lymph Nodes: No lymphadenapathy  Neck:  Supple  Lungs: decreased breathe sound rt>>left  Cardiovascular: S1S2  Abdomen: Soft, non tender  Extremities: NO edema or cyanosis  Skin: Intact          LAB:                        8.1    13.04 )-----------( 183      ( 20 Feb 2018 06:43 )             28.4                                               02-20    139  |  100  |  66<HH>  ----------------------------<  184<H>  4.6   |  26  |  4.7<HH>    Ca    8.5      20 Feb 2018 06:43    TPro  5.2<L>  /  Alb  2.2<L>  /  TBili  1.9<H>  /  DBili  x   /  AST  39  /  ALT  14  /  AlkPhos  212<H>  02-19                                               CARDIAC MARKERS ( 20 Feb 2018 06:43 )  0.05 ng/mL / x     / 20 U/L / x     / 2.3 ng/mL  CARDIAC MARKERS ( 19 Feb 2018 16:18 )  0.06 ng/mL / x     / x     / x     / 2.5 ng/mL                                            LIVER FUNCTIONS - ( 19 Feb 2018 16:18 )  Alb: 2.2 g/dL / Pro: 5.2 g/dL / ALK PHOS: 212 U/L / ALT: 14 U/L / AST: 39 U/L / GGT: x       B-Type Natriuretic Peptide (02.20.18 @ 06:43)    B-Type Natriuretic Peptide: >4902: OVR pg/mL           MEDICATIONS  (STANDING):  ALBUTerol/ipratropium for Nebulization 3 milliLiter(s) Nebulizer every 6 hours  amiodarone   Oral Tab/Cap - Peds 100 milliGRAM(s) Oral two times a day  aspirin enteric coated 81 milliGRAM(s) Oral daily  atorvastatin Oral Tab/Cap - Peds 40 milliGRAM(s) Oral at bedtime  BACItracin   Ointment 1 Application(s) Topical two times a day  clopidogrel Tablet 75 milliGRAM(s) Oral daily  docusate sodium Oral Tab/Cap - Peds 200 milliGRAM(s) Oral daily  enalapril 5 milliGRAM(s) Oral daily  guaiFENesin  milliGRAM(s) Oral every 12 hours  heparin  Injectable 5000 Unit(s) SubCutaneous every 8 hours  NIFEdipine XL 60 milliGRAM(s) Oral daily  pantoprazole    Tablet 40 milliGRAM(s) Oral before breakfast  polyethylene glycol 3350 17 Gram(s) Oral daily  predniSONE   Tablet 5 milliGRAM(s) Oral daily  senna Oral Tab/Cap - Peds 2 Tablet(s) Oral at bedtime  sevelamer hydrochloride 800 milliGRAM(s) Oral three times a day with meals    MEDICATIONS  (PRN):  acetaminophen   Tablet. 650 milliGRAM(s) Oral every 6 hours PRN Mild Pain (1 - 3) Patient is a 86y old  Male who presents with a chief complaint of SOB (19 Feb 2018 23:42)      HPI:  87 yo male patient with PMHx of ESRD on HD (M/W/F), CAD s/p CABG, paroxysmal AF not on AC, HTN, DL, Systolic CHF (EF 15-25%), sent from [facility] because of the above chief complaint.  Patient was recently admitted to [facility] (on 2/11/18) because of shortness of breath, was found to be in fluid overload (CHF and pleural effusion) with possible underlying pneumonia, s/p antibiotherapy and HD, improved, and was discharged to NH again 4 days prior to his presentation. Thoracentesis was not done during the last admission as patient had stable chronic effusion with no respiratory distress.    On the day of presentation, while he was getting dialyzed, patient started feeling short of breath. According to NH papers, patient was on dialysis for 2h (they were able to remove 1L of fluid) before his symptoms started. They decided to send him to the ED for evaluation.    PAST MEDICAL & SURGICAL HISTORY:  Acute on chronic systolic heart failure: EF 15-25%  Hypertension, unspecified type  Atrial fibrillation  Pleural effusion due to another disorder  Diabetes  Coronary artery disease involving coronary bypass graft without angina pectoris, unspecified whether native or transplanted heart  Hemodialysis access, fistula mature  ESRD (end stage renal disease)  A-V fistula  S/P CABG x 3       FAMILY HISTORY:  No pertinent family history in first degree relatives    Allergies    No Known Allergies              PHYSICAL EXAM    T(C): 35.5 (02-20-18 @ 07:20), Max: 37.6 (02-19-18 @ 15:57)  HR: 68 (02-20-18 @ 07:20) (68 - 87)  BP: 102/48 (02-20-18 @ 07:20) (102/48 - 159/72)  RR: 18 (02-20-18 @ 07:20) (17 - 19)  SpO2: 100% (02-19-18 @ 19:15) (100% - 100%)            General:    HEENT: Agonal breathing             Lymph Nodes: No lymphadenapathy  Neck:  Supple  Lungs: decreased breathe sound rt>>left.  RR 3-4   Cardiovascular: S1S2  Abdomen: Soft, non tender  Extremities: NO edema or cyanosis  Skin: Intact          LAB:                        8.1    13.04 )-----------( 183      ( 20 Feb 2018 06:43 )             28.4                                               02-20    139  |  100  |  66<HH>  ----------------------------<  184<H>  4.6   |  26  |  4.7<HH>    Ca    8.5      20 Feb 2018 06:43    TPro  5.2<L>  /  Alb  2.2<L>  /  TBili  1.9<H>  /  DBili  x   /  AST  39  /  ALT  14  /  AlkPhos  212<H>  02-19                                               CARDIAC MARKERS ( 20 Feb 2018 06:43 )  0.05 ng/mL / x     / 20 U/L / x     / 2.3 ng/mL  CARDIAC MARKERS ( 19 Feb 2018 16:18 )  0.06 ng/mL / x     / x     / x     / 2.5 ng/mL                                            LIVER FUNCTIONS - ( 19 Feb 2018 16:18 )  Alb: 2.2 g/dL / Pro: 5.2 g/dL / ALK PHOS: 212 U/L / ALT: 14 U/L / AST: 39 U/L / GGT: x       B-Type Natriuretic Peptide (02.20.18 @ 06:43)    B-Type Natriuretic Peptide: >4902: OVR pg/mL           MEDICATIONS  (STANDING):  ALBUTerol/ipratropium for Nebulization 3 milliLiter(s) Nebulizer every 6 hours  amiodarone   Oral Tab/Cap - Peds 100 milliGRAM(s) Oral two times a day  aspirin enteric coated 81 milliGRAM(s) Oral daily  atorvastatin Oral Tab/Cap - Peds 40 milliGRAM(s) Oral at bedtime  BACItracin   Ointment 1 Application(s) Topical two times a day  clopidogrel Tablet 75 milliGRAM(s) Oral daily  docusate sodium Oral Tab/Cap - Peds 200 milliGRAM(s) Oral daily  enalapril 5 milliGRAM(s) Oral daily  guaiFENesin  milliGRAM(s) Oral every 12 hours  heparin  Injectable 5000 Unit(s) SubCutaneous every 8 hours  NIFEdipine XL 60 milliGRAM(s) Oral daily  pantoprazole    Tablet 40 milliGRAM(s) Oral before breakfast  polyethylene glycol 3350 17 Gram(s) Oral daily  predniSONE   Tablet 5 milliGRAM(s) Oral daily  senna Oral Tab/Cap - Peds 2 Tablet(s) Oral at bedtime  sevelamer hydrochloride 800 milliGRAM(s) Oral three times a day with meals    MEDICATIONS  (PRN):  acetaminophen   Tablet. 650 milliGRAM(s) Oral every 6 hours PRN Mild Pain (1 - 3)

## 2018-02-20 NOTE — DISCHARGE NOTE FOR THE EXPIRED PATIENT - HOSPITAL COURSE
85 yo male patient with PMHx of ESRD on HD (M/W/F), CAD s/p CABG, paroxysmal AF not on AC, HTN, DL, Systolic CHF (EF 15-25%), sent from [facility] because of Shortness of breath during HD. Patient was recently admitted to [facility] (on 2/11/18) because of shortness of breath, was found to be in fluid overload (CHF and pleural effusion) with possible underlying pneumonia, s/p antibiotherapy and HD, improved, and was discharged to NH again 4 days prior to his presentation. On admission his CXR and CT showing large effusions more on the right, CHF, and debris in the right bronchus. He was to bee seen by pulmonary team and cardiology team but At 1120 am 2/20/2018 He arrested and was pronounced dead at 1122 am.

## 2018-02-20 NOTE — CONSULT NOTE ADULT - ASSESSMENT
right pleural effusion (chronic) and pulmonary edema secondary to volume overload  HF with reduced EF  ESRD on HD  Afib no anticoagulation    Plan:   right side thoracentesis for symptomatic relief  HD tomorrow  If tolerates then Lasix challenge  avoid fluid overload  clarify code status  may be a candidate for Pleurx if  family agrees. Impression:  right pleural effusion (chronic) and pulmonary edema secondary to volume overload  HF with reduced EF  ESRD on HD  Afib no anticoagulation    Plan:   right side thoracentesis for symptomatic relief  HD tomorrow  Lasix iv if making urine and BP tolerates  avoid fluid overload  clarify code status  DVT ppx  may be a candidate for Pleurx if  family agrees. Impression:  right pleural effusion (chronic) and pulmonary edema secondary to volume overload  HF with reduced EF  ESRD on HD  Afib no anticoagulation  Patient is actively dying    Plan:   Comfort care.  Patient is DNR  avoid fluid overload  Keep NPO for now   DVT ppx  Will not benefit from thoracentesis.

## 2018-02-20 NOTE — PROGRESS NOTE ADULT - SUBJECTIVE AND OBJECTIVE BOX
KEE GARCIA  86y  Male    Patient is a 86y old  Male who presents with a chief complaint of SOB (19 Feb 2018 23:42)      INTERVAL HPI/OVERNIGHT EVENTS:    T(C): 36.2 (02-20-18 @ 00:00), Max: 37.6 (02-19-18 @ 15:57)  HR: 83 (02-20-18 @ 00:00) (74 - 87)  BP: 121/57 (02-20-18 @ 00:00) (121/57 - 159/72)  RR: 19 (02-20-18 @ 00:00) (17 - 19)  SpO2: 100% (02-19-18 @ 19:15) (100% - 100%)  Wt(kg): --Vital Signs Last 24 Hrs  T(C): 36.2 (20 Feb 2018 00:00), Max: 37.6 (19 Feb 2018 15:57)  T(F): 97.2 (20 Feb 2018 00:00), Max: 99.6 (19 Feb 2018 15:57)  HR: 83 (20 Feb 2018 00:00) (74 - 87)  BP: 121/57 (20 Feb 2018 00:00) (121/57 - 159/72)  BP(mean): --  RR: 19 (20 Feb 2018 00:00) (17 - 19)  SpO2: 100% (19 Feb 2018 19:15) (100% - 100%)    PHYSICAL EXAM:  GENERAL: NAD, well-groomed, well-developed  HEAD:  Atraumatic, Normocephalic  NECK: Supple, No JVD, Normal thyroid  NERVOUS SYSTEM:  Alert & Oriented X3, Good concentration; Motor Strength 5/5 B/L upper and lower extremities; DTRs 2+ intact and symmetric  CHEST/LUNG: Clear to percussion bilaterally; No rales, rhonchi, wheezing, or rubs  HEART: Regular rate and rhythm; No murmurs, rubs, or gallops  ABDOMEN: Soft, Nontender, Nondistended; Bowel sounds present  EXTREMITIES:  2+ Peripheral Pulses, No clubbing, cyanosis, or edema    Consultant(s) Notes Reviewed:  [x ] YES  [ ] NO    Discussed with Consultants/Other Providers/Residents [ x] YES     LABS                         8.2    14.97 )-----------( 144      ( 19 Feb 2018 16:18 )             27.6     02-19    139  |  99  |  41<H>  ----------------------------<  255<H>  5.4<H>   |  30  |  3.3<H>    Ca    8.2<L>      19 Feb 2018 16:18    TPro  5.2<L>  /  Alb  2.2<L>  /  TBili  1.9<H>  /  DBili  x   /  AST  39  /  ALT  14  /  AlkPhos  212<H>  02-19          LIVER FUNCTIONS - ( 19 Feb 2018 16:18 )  Alb: 2.2 g/dL / Pro: 5.2 g/dL / ALK PHOS: 212 U/L / ALT: 14 U/L / AST: 39 U/L / GGT: x           CAPILLARY BLOOD GLUCOSE            RADIOLOGY & ADDITIONAL TESTS:    Imaging Personally Reviewed:  [x ] YES  [ ] NO    MEDICATIONS  (STANDING):  ALBUTerol/ipratropium for Nebulization 3 milliLiter(s) Nebulizer every 6 hours  amiodarone   Oral Tab/Cap - Peds 100 milliGRAM(s) Oral two times a day  aspirin enteric coated 81 milliGRAM(s) Oral daily  atorvastatin Oral Tab/Cap - Peds 40 milliGRAM(s) Oral at bedtime  BACItracin   Ointment 1 Application(s) Topical two times a day  clopidogrel Tablet 75 milliGRAM(s) Oral daily  docusate sodium Oral Tab/Cap - Peds 200 milliGRAM(s) Oral daily  enalapril 5 milliGRAM(s) Oral daily  guaiFENesin  milliGRAM(s) Oral every 12 hours  heparin  Injectable 5000 Unit(s) SubCutaneous every 8 hours  NIFEdipine XL 60 milliGRAM(s) Oral daily  pantoprazole    Tablet 40 milliGRAM(s) Oral before breakfast  polyethylene glycol 3350 17 Gram(s) Oral daily  predniSONE   Tablet 5 milliGRAM(s) Oral daily  senna Oral Tab/Cap - Peds 2 Tablet(s) Oral at bedtime  sevelamer hydrochloride 800 milliGRAM(s) Oral three times a day with meals    MEDICATIONS  (PRN):  acetaminophen   Tablet. 650 milliGRAM(s) Oral every 6 hours PRN Mild Pain (1 - 3)      HEALTH ISSUES - PROBLEM Dx:

## 2018-02-22 DIAGNOSIS — Z87.01 PERSONAL HISTORY OF PNEUMONIA (RECURRENT): ICD-10-CM

## 2018-02-22 DIAGNOSIS — I48.0 PAROXYSMAL ATRIAL FIBRILLATION: ICD-10-CM

## 2018-02-22 DIAGNOSIS — I50.23 ACUTE ON CHRONIC SYSTOLIC (CONGESTIVE) HEART FAILURE: ICD-10-CM

## 2018-02-22 DIAGNOSIS — Z66 DO NOT RESUSCITATE: ICD-10-CM

## 2018-02-22 DIAGNOSIS — E87.5 HYPERKALEMIA: ICD-10-CM

## 2018-02-22 DIAGNOSIS — E11.22 TYPE 2 DIABETES MELLITUS WITH DIABETIC CHRONIC KIDNEY DISEASE: ICD-10-CM

## 2018-02-22 DIAGNOSIS — I46.9 CARDIAC ARREST, CAUSE UNSPECIFIED: ICD-10-CM

## 2018-02-22 DIAGNOSIS — I13.2 HYPERTENSIVE HEART AND CHRONIC KIDNEY DISEASE WITH HEART FAILURE AND WITH STAGE 5 CHRONIC KIDNEY DISEASE, OR END STAGE RENAL DISEASE: ICD-10-CM

## 2018-02-22 DIAGNOSIS — N18.6 END STAGE RENAL DISEASE: ICD-10-CM

## 2018-02-22 DIAGNOSIS — Z95.1 PRESENCE OF AORTOCORONARY BYPASS GRAFT: ICD-10-CM

## 2018-02-22 DIAGNOSIS — Z99.2 DEPENDENCE ON RENAL DIALYSIS: ICD-10-CM

## 2018-02-22 DIAGNOSIS — R06.02 SHORTNESS OF BREATH: ICD-10-CM

## 2018-02-22 DIAGNOSIS — I25.10 ATHEROSCLEROTIC HEART DISEASE OF NATIVE CORONARY ARTERY WITHOUT ANGINA PECTORIS: ICD-10-CM

## 2018-02-22 DIAGNOSIS — R00.0 TACHYCARDIA, UNSPECIFIED: ICD-10-CM

## 2018-02-26 DIAGNOSIS — J96.01 ACUTE RESPIRATORY FAILURE WITH HYPOXIA: ICD-10-CM

## 2018-02-26 DIAGNOSIS — A41.9 SEPSIS, UNSPECIFIED ORGANISM: ICD-10-CM

## 2018-02-28 DIAGNOSIS — I13.2 HYPERTENSIVE HEART AND CHRONIC KIDNEY DISEASE WITH HEART FAILURE AND WITH STAGE 5 CHRONIC KIDNEY DISEASE, OR END STAGE RENAL DISEASE: ICD-10-CM

## 2018-02-28 DIAGNOSIS — I50.9 HEART FAILURE, UNSPECIFIED: ICD-10-CM

## 2018-03-06 DIAGNOSIS — L89.322 PRESSURE ULCER OF LEFT BUTTOCK, STAGE 2: ICD-10-CM

## 2018-03-06 DIAGNOSIS — J91.8 PLEURAL EFFUSION IN OTHER CONDITIONS CLASSIFIED ELSEWHERE: ICD-10-CM

## 2018-07-17 PROBLEM — J44.9 CHRONIC OBSTRUCTIVE PULMONARY DISEASE, UNSPECIFIED: Chronic | Status: INACTIVE | Noted: 2018-02-12 | Resolved: 2018-02-19

## 2023-06-08 NOTE — DISCHARGE NOTE ADULT - ABILITY TO HEAR (WITH HEARING AID OR HEARING APPLIANCE IF NORMALLY USED):
I called [facility] and they needed more information on the forms but I was unable to provide the answers. I spoke with Cam and provided him with the needed information. He will to give more information. Adequate: hears normal conversation without difficulty

## 2025-02-13 NOTE — H&P ADULT - CLICK TO LAUNCH ORM
Stop Levaquin, cont  cough med, unsure  if  weakness  is reaction to Levaquin, check  bnp, d-dimer,cbc, troponin    Orders:    CBC and differential    D-dimer, quantitative; Future    B-Type Natriuretic Peptide(BNP); Future    High Sensitivity Troponin I Random; Future    predniSONE 20 mg tablet; Take 1 tablet (20 mg total) by mouth 2 (two) times a day with meals for 5 days    POCT ECG     .